# Patient Record
Sex: MALE | Race: WHITE | NOT HISPANIC OR LATINO | Employment: FULL TIME | URBAN - METROPOLITAN AREA
[De-identification: names, ages, dates, MRNs, and addresses within clinical notes are randomized per-mention and may not be internally consistent; named-entity substitution may affect disease eponyms.]

---

## 2019-05-22 ENCOUNTER — LAB (OUTPATIENT)
Dept: INTERNAL MEDICINE | Facility: CLINIC | Age: 44
End: 2019-05-22

## 2019-05-22 DIAGNOSIS — E78.2 MIXED HYPERLIPIDEMIA: Primary | ICD-10-CM

## 2019-05-22 DIAGNOSIS — E11.8 TYPE 2 DIABETES MELLITUS WITH COMPLICATION, WITHOUT LONG-TERM CURRENT USE OF INSULIN (HCC): ICD-10-CM

## 2019-05-22 DIAGNOSIS — I10 ESSENTIAL HYPERTENSION: ICD-10-CM

## 2019-05-22 NOTE — PROGRESS NOTES
Subjective   No chief complaint on file.      History of Present Illness     44 yo wm presents for follow up regarding his DM, HTN, HLD and obesity. He was last seen by myself on 09/ 28/18 at which time his A1C was 7.1%. He report that he had fallen off the wagon and was insistent that he was committed to LSM to better control his diabetes. He is eating lots of salad, drinking water, and is going to the gym 2-3 times a week. He has never had formal diabetes education. He will have his eyes checked in July. Denies polyuria, phagia or dipsia. He is only taking his glipizide once daily. Denies CP or dyspnea.       Patient Active Problem List   Diagnosis   • Hypertension   • Diabetes mellitus (CMS/HCC)       Allergies no known allergies    Current Outpatient Medications on File Prior to Visit   Medication Sig Dispense Refill   • aspirin 81 MG EC tablet Take 81 mg by mouth Daily.     • atorvastatin (LIPITOR) 10 MG tablet Take 10 mg by mouth Daily.     • glipiZIDE (GLUCOTROL) 10 MG tablet Take 10 mg by mouth 2 (Two) Times a Day Before Meals.     • lisinopril (PRINIVIL,ZESTRIL) 20 MG tablet Take 20 mg by mouth Daily.     • naproxen sodium (ALEVE) 220 MG tablet Take 220 mg by mouth 2 (Two) Times a Day As Needed.     • raNITIdine HCl (ZANTAC PO) Take  by mouth.     • sitaGLIPtin-metFORMIN (JANUMET)  MG per tablet Take 1 tablet by mouth 2 (Two) Times a Day With Meals.       No current facility-administered medications on file prior to visit.        Past Medical History:   Diagnosis Date   • Diabetes mellitus (CMS/HCC)    • GERD (gastroesophageal reflux disease)    • Hypertension    • Low back pain    • Neck pain        No family history on file.    Social History     Socioeconomic History   • Marital status:      Spouse name: Not on file   • Number of children: Not on file   • Years of education: Not on file   • Highest education level: Not on file   Tobacco Use   • Smoking status: Never Smoker   Substance and  Sexual Activity   • Alcohol use: No     Frequency: Never       No past surgical history on file.      The following portions of the patient's history were reviewed and updated as appropriate: problem list, allergies, current medications, past medical history and past social history.    Review of Systems   Constitution: Negative.   Cardiovascular: Negative.    Respiratory: Negative.    Endocrine: Negative.        Immunization History   Administered Date(s) Administered   • DTaP 08/22/2014   • Flu Vaccine Quad PF >36MO 10/21/2017   • Pneumococcal Conjugate 13-Valent (PCV13) 09/28/2018   • Pneumococcal Polysaccharide (PPSV23) 09/04/2015   • Td 09/01/2004       Objective   There were no vitals filed for this visit.  Physical Exam   Constitutional: He is oriented to person, place, and time. He appears well-developed and well-nourished.   HENT:   Head: Normocephalic and atraumatic.   Eyes: No scleral icterus.   Neck: Neck supple.   Cardiovascular: Normal rate, regular rhythm, S1 normal, S2 normal, normal heart sounds and intact distal pulses. Exam reveals no gallop and no friction rub.   No murmur heard.  Pulmonary/Chest: Effort normal and breath sounds normal.   Musculoskeletal:   Ambulates without assistance; no clubbing, cyanosis or edema.    Neurological: He is alert and oriented to person, place, and time.   Skin: Skin is warm and dry.   Psychiatric: He has a normal mood and affect.   Vitals reviewed.      Procedures    Assessment/Plan   Darvin was seen today for follow-up and hyperlipidemia.    Diagnoses and all orders for this visit:    Type 2 diabetes mellitus without complication, without long-term current use of insulin (CMS/[facility])  Comments:  A1C not at goal; has only been taking Glipizide once daily. Will take it as prescribed. Encouraged LSM and will start diabetes education.   Orders:  -     Ambulatory Referral to Diabetic Education  -     atorvastatin (LIPITOR) 10 MG tablet; Take 1 tablet by mouth Daily.  -      glipiZIDE (GLUCOTROL) 10 MG tablet; Take 1 tablet by mouth 2 (Two) Times a Day Before Meals.  -     sitaGLIPtin-metFORMIN (JANUMET)  MG per tablet; Take 1 tablet by mouth 2 (Two) Times a Day With Meals.  -     Hepatitis B Vaccine Adult IM    Essential hypertension  Comments:  BP at goal and well controlled with Lisinopril 20 mg daily.   Orders:  -     lisinopril (PRINIVIL,ZESTRIL) 20 MG tablet; Take 1 tablet by mouth Daily.    Class 1 obesity without serious comorbidity with body mass index (BMI) of 33.0 to 33.9 in adult, unspecified obesity type  Comments:  Have strongly encouraged weight loss with goal of 1 # weekly over the next 3 months for a weight loss of twelve pounds prior to his next visit.       Reviewed A1C, urine for microalbumin, FLP and CMP dated 05/22/19 as well as previous office visit dated 09/28/18.     Discussion: Unfortunately he has only been taking his glipizide once daily and has not kept his follow up appointments. I have stressed the importance of glycemic control with patient and that he needs to be seen every 3 months . He needs formal diabetes education as well. LSM strongly emphasized.     30 minutes spent with patient with greater than 50% counseling and coordinating care.     No Follow-up on file.

## 2019-05-23 PROBLEM — I10 HYPERTENSION: Status: ACTIVE | Noted: 2019-05-23

## 2019-05-23 PROBLEM — E78.00 PURE HYPERCHOLESTEROLEMIA: Status: ACTIVE | Noted: 2019-05-23

## 2019-05-23 PROBLEM — E11.9 DIABETES MELLITUS: Status: ACTIVE | Noted: 2019-05-23

## 2019-05-23 LAB
ALBUMIN SERPL-MCNC: 4.3 G/DL (ref 3.5–5.2)
ALBUMIN/CREAT UR: <7.4 MG/G CREAT (ref 0–30)
ALBUMIN/GLOB SERPL: 1.6 G/DL
ALP SERPL-CCNC: 58 U/L (ref 39–117)
ALT SERPL-CCNC: 48 U/L (ref 1–41)
AST SERPL-CCNC: 26 U/L (ref 1–40)
BILIRUB SERPL-MCNC: 0.4 MG/DL (ref 0.2–1.2)
BUN SERPL-MCNC: 8 MG/DL (ref 6–20)
BUN/CREAT SERPL: 8.6 (ref 7–25)
CALCIUM SERPL-MCNC: 10.4 MG/DL (ref 8.6–10.5)
CHLORIDE SERPL-SCNC: 101 MMOL/L (ref 98–107)
CHOLEST SERPL-MCNC: 120 MG/DL (ref 0–200)
CO2 SERPL-SCNC: 26.1 MMOL/L (ref 22–29)
CREAT SERPL-MCNC: 0.93 MG/DL (ref 0.76–1.27)
CREAT UR-MCNC: 40.5 MG/DL
GLOBULIN SER CALC-MCNC: 2.7 GM/DL
GLUCOSE SERPL-MCNC: 111 MG/DL (ref 65–99)
HBA1C MFR BLD: 8.9 % (ref 4.8–5.6)
HDLC SERPL-MCNC: 41 MG/DL (ref 40–60)
LDLC SERPL CALC-MCNC: 51 MG/DL (ref 0–100)
MICROALBUMIN UR-MCNC: <3 UG/ML
POTASSIUM SERPL-SCNC: 4.3 MMOL/L (ref 3.5–5.2)
PROT SERPL-MCNC: 7 G/DL (ref 6–8.5)
SODIUM SERPL-SCNC: 140 MMOL/L (ref 136–145)
TRIGL SERPL-MCNC: 139 MG/DL (ref 0–150)
VLDLC SERPL CALC-MCNC: 27.8 MG/DL

## 2019-05-24 ENCOUNTER — OFFICE VISIT (OUTPATIENT)
Dept: INTERNAL MEDICINE | Facility: CLINIC | Age: 44
End: 2019-05-24

## 2019-05-24 VITALS
HEIGHT: 72 IN | BODY MASS INDEX: 33.86 KG/M2 | HEART RATE: 82 BPM | WEIGHT: 250 LBS | OXYGEN SATURATION: 98 % | TEMPERATURE: 97.8 F

## 2019-05-24 DIAGNOSIS — E11.9 TYPE 2 DIABETES MELLITUS WITHOUT COMPLICATION, WITHOUT LONG-TERM CURRENT USE OF INSULIN (HCC): Primary | ICD-10-CM

## 2019-05-24 DIAGNOSIS — E66.9 CLASS 1 OBESITY WITHOUT SERIOUS COMORBIDITY WITH BODY MASS INDEX (BMI) OF 33.0 TO 33.9 IN ADULT, UNSPECIFIED OBESITY TYPE: ICD-10-CM

## 2019-05-24 DIAGNOSIS — I10 ESSENTIAL HYPERTENSION: ICD-10-CM

## 2019-05-24 PROBLEM — E66.811 CLASS 1 OBESITY WITHOUT SERIOUS COMORBIDITY WITH BODY MASS INDEX (BMI) OF 33.0 TO 33.9 IN ADULT: Status: ACTIVE | Noted: 2019-05-24

## 2019-05-24 PROCEDURE — 90471 IMMUNIZATION ADMIN: CPT | Performed by: NURSE PRACTITIONER

## 2019-05-24 PROCEDURE — 99214 OFFICE O/P EST MOD 30 MIN: CPT | Performed by: NURSE PRACTITIONER

## 2019-05-24 PROCEDURE — 90746 HEPB VACCINE 3 DOSE ADULT IM: CPT | Performed by: NURSE PRACTITIONER

## 2019-05-24 RX ORDER — SITAGLIPTIN AND METFORMIN HYDROCHLORIDE 1000; 50 MG/1; MG/1
TABLET, FILM COATED, EXTENDED RELEASE ORAL
Refills: 3 | COMMUNITY
Start: 2019-04-24 | End: 2019-05-24 | Stop reason: SDUPTHER

## 2019-05-24 RX ORDER — ATORVASTATIN CALCIUM 10 MG/1
10 TABLET, FILM COATED ORAL DAILY
Qty: 30 TABLET | Refills: 5 | Status: SHIPPED | OUTPATIENT
Start: 2019-05-24 | End: 2019-11-22 | Stop reason: SDUPTHER

## 2019-05-24 RX ORDER — LISINOPRIL 20 MG/1
20 TABLET ORAL DAILY
Qty: 30 TABLET | Refills: 5 | Status: SHIPPED | OUTPATIENT
Start: 2019-05-24 | End: 2019-11-22 | Stop reason: SDUPTHER

## 2019-05-24 RX ORDER — GLIPIZIDE 10 MG/1
10 TABLET ORAL
Qty: 60 TABLET | Refills: 5 | Status: SHIPPED | OUTPATIENT
Start: 2019-05-24 | End: 2019-11-22 | Stop reason: SDUPTHER

## 2019-05-28 ENCOUNTER — TELEPHONE (OUTPATIENT)
Dept: INTERNAL MEDICINE | Facility: CLINIC | Age: 44
End: 2019-05-28

## 2019-05-28 NOTE — TELEPHONE ENCOUNTER
Darvin Leonard saw Tiffanie Hernandez last Friday.  His diabetes medication that was sent to [facility] was incorrect.  It should have been Janumet XR and it was just regulare Janumet.  He did not pick it up yet. Phone number is [phone].  He would like the correct one sent in please.

## 2019-05-29 DIAGNOSIS — E11.9 TYPE 2 DIABETES MELLITUS WITHOUT COMPLICATION, WITHOUT LONG-TERM CURRENT USE OF INSULIN (HCC): ICD-10-CM

## 2019-07-05 ENCOUNTER — CLINICAL SUPPORT (OUTPATIENT)
Dept: INTERNAL MEDICINE | Facility: CLINIC | Age: 44
End: 2019-07-05

## 2019-07-05 DIAGNOSIS — Z23 NEED FOR HEPATITIS B VACCINATION: Primary | ICD-10-CM

## 2019-07-05 PROCEDURE — 90471 IMMUNIZATION ADMIN: CPT | Performed by: NURSE PRACTITIONER

## 2019-07-05 PROCEDURE — 90746 HEPB VACCINE 3 DOSE ADULT IM: CPT | Performed by: NURSE PRACTITIONER

## 2019-08-27 DIAGNOSIS — E11.9 TYPE 2 DIABETES MELLITUS WITHOUT COMPLICATION, WITHOUT LONG-TERM CURRENT USE OF INSULIN (HCC): Primary | ICD-10-CM

## 2019-11-22 ENCOUNTER — LAB (OUTPATIENT)
Dept: INTERNAL MEDICINE | Facility: CLINIC | Age: 44
End: 2019-11-22

## 2019-11-22 ENCOUNTER — CLINICAL SUPPORT (OUTPATIENT)
Dept: INTERNAL MEDICINE | Facility: CLINIC | Age: 44
End: 2019-11-22

## 2019-11-22 DIAGNOSIS — I10 ESSENTIAL HYPERTENSION: ICD-10-CM

## 2019-11-22 DIAGNOSIS — E11.9 TYPE 2 DIABETES MELLITUS WITHOUT COMPLICATION, WITHOUT LONG-TERM CURRENT USE OF INSULIN (HCC): ICD-10-CM

## 2019-11-22 DIAGNOSIS — Z23 NEED FOR HEPATITIS B VACCINATION: Primary | ICD-10-CM

## 2019-11-22 PROCEDURE — 90746 HEPB VACCINE 3 DOSE ADULT IM: CPT | Performed by: NURSE PRACTITIONER

## 2019-11-22 PROCEDURE — 90471 IMMUNIZATION ADMIN: CPT | Performed by: NURSE PRACTITIONER

## 2019-11-22 RX ORDER — LISINOPRIL 20 MG/1
20 TABLET ORAL DAILY
Qty: 30 TABLET | Refills: 0 | Status: SHIPPED | OUTPATIENT
Start: 2019-11-22 | End: 2019-12-23

## 2019-11-22 RX ORDER — ATORVASTATIN CALCIUM 10 MG/1
10 TABLET, FILM COATED ORAL DAILY
Qty: 30 TABLET | Refills: 0 | Status: SHIPPED | OUTPATIENT
Start: 2019-11-22 | End: 2019-12-23 | Stop reason: SDUPTHER

## 2019-11-22 RX ORDER — GLIPIZIDE 10 MG/1
10 TABLET ORAL
Qty: 60 TABLET | Refills: 0 | Status: SHIPPED | OUTPATIENT
Start: 2019-11-22 | End: 2019-12-23 | Stop reason: SDUPTHER

## 2019-11-23 LAB — HBA1C MFR BLD: 8.8 % (ref 4.8–5.6)

## 2019-11-24 NOTE — PROGRESS NOTES
Subjective   Chief Complaint   Patient presents with   • Diabetes   • Hypertension   • Obesity   • Hyperlipidemia       History of Present Illness     42 yo wm presents for follow up regarding DM, HTN, HLD and obesity.  He eats mostly grilled chicken, rice and salad. He denies low blood sugars. He has not had his eyes checked in 1-2 years. He has not had his flu shot. He denies chest pain or dyspnea. Exercises infrequently. Has had DMed 3 years ago and initially had great success with weight loss. Has not had his flu shot yet.      Patient Active Problem List   Diagnosis   • Hypertension   • Diabetes mellitus (CMS/HCC)   • Class 1 obesity without serious comorbidity with body mass index (BMI) of 33.0 to 33.9 in adult       No Known Allergies    Current Outpatient Medications on File Prior to Visit   Medication Sig Dispense Refill   • aspirin 81 MG EC tablet Take 81 mg by mouth Daily.     • atorvastatin (LIPITOR) 10 MG tablet Take 1 tablet by mouth Daily. 30 tablet 0   • glipizide (GLUCOTROL) 10 MG tablet Take 1 tablet by mouth 2 (Two) Times a Day Before Meals. 60 tablet 0   • lisinopril (PRINIVIL,ZESTRIL) 20 MG tablet Take 1 tablet by mouth Daily. 30 tablet 0   • naproxen sodium (ALEVE) 220 MG tablet Take 220 mg by mouth 2 (Two) Times a Day As Needed.     • [DISCONTINUED] SITagliptin-metFORMIN HCl ER (JANUMET XR)  MG tablet Take 1 tablet by mouth 2 (Two) Times a Day. 60 tablet 0   • raNITIdine HCl (ZANTAC PO) Take  by mouth.       No current facility-administered medications on file prior to visit.        Past Medical History:   Diagnosis Date   • Diabetes mellitus (CMS/HCC)    • Fatigue    • GERD (gastroesophageal reflux disease)    • Headache    • Hypertension    • Knee pain    • Low back pain    • Neck pain        Family History   Problem Relation Age of Onset   • Diabetes Maternal Grandmother        Social History     Socioeconomic History   • Marital status:      Spouse name: Not on file   •  "Number of children: Not on file   • Years of education: Not on file   • Highest education level: Not on file   Tobacco Use   • Smoking status: Never Smoker   Substance and Sexual Activity   • Alcohol use: Yes     Frequency: Never     Comment: socially        History reviewed. No pertinent surgical history.    The following portions of the patient's history were reviewed and updated as appropriate: problem list, allergies, current medications, past medical history, past family history, past social history and past surgical history.    Review of Systems   Respiratory: Negative for shortness of breath.    Cardiovascular: Negative for chest pain.       Immunization History   Administered Date(s) Administered   • Flu Vaccine Quad PF >36MO 10/21/2017   • Hepatitis B Vaccine Adult IM 05/24/2019, 07/05/2019, 11/22/2019   • Pneumococcal Conjugate 13-Valent (PCV13) 09/28/2018   • Pneumococcal Polysaccharide (PPSV23) 09/04/2015   • Td 09/01/2004   • Tdap 08/22/2014       Objective   Vitals:    11/26/19 1408 11/26/19 1455   BP:  116/76   Pulse:  92   Resp:  12   Weight: 113 kg (249 lb)    Height: 182.9 cm (72\")      Body mass index is 33.77 kg/m².  Physical Exam   Constitutional: He is oriented to person, place, and time. He appears well-developed and well-nourished.   HENT:   Head: Normocephalic and atraumatic.   Neck: Neck supple.   Cardiovascular: Normal rate, regular rhythm, S1 normal, S2 normal, normal heart sounds and intact distal pulses.   Pulmonary/Chest: Effort normal and breath sounds normal.   Musculoskeletal:   Ambulates without assistance; no clubbing, cyanosis or edema.     Darvin had a diabetic foot exam performed today.   During the foot exam he had a monofilament test performed.  Neurological: He is alert and oriented to person, place, and time.   Skin: Skin is warm and dry.   Psychiatric: He has a normal mood and affect.   Vitals reviewed.      Procedures    Assessment/Plan   Darvin was seen today for " diabetes, hypertension, obesity and hyperlipidemia.    Diagnoses and all orders for this visit:    Type 2 diabetes mellitus without complication, without long-term current use of insulin (CMS/[facility])  Comments:  Not at goal. A1C 8.8%. Stressed importance of compliance with follow up visits every 3 months.Eye exam prior to f/up visit.   Orders:  -     Hemoglobin A1c; Future  -     Empagliflozin (JARDIANCE) 10 MG tablet; Take 10 mg by mouth Daily for 120 days.  -     metFORMIN (GLUCOPHAGE) 1000 MG tablet; Take 1 tablet by mouth 2 (Two) Times a Day With Meals.    Class 1 obesity without serious comorbidity with body mass index (BMI) of 33.0 to 33.9 in adult, unspecified obesity type  Comments:  Strongly advised weight loss via dietary changes and 150 minutes aerobic activity weekly.     Essential hypertension  Comments:  Well controlled. Continue Lisinopril 20 mg daily.   Orders:  -     Comprehensive Metabolic Panel; Future    Heart murmur  Comments:  New problem; further MDM pending 2D echo.   Orders:  -     Adult Transthoracic Echo Complete W/ Cont if Necessary Per Protocol; Future    Records reviewed include previous OV with myself and recent labs.     Return in about 3 months (around 2/26/2020).

## 2019-11-26 ENCOUNTER — OFFICE VISIT (OUTPATIENT)
Dept: INTERNAL MEDICINE | Facility: CLINIC | Age: 44
End: 2019-11-26

## 2019-11-26 VITALS
SYSTOLIC BLOOD PRESSURE: 116 MMHG | HEIGHT: 72 IN | BODY MASS INDEX: 33.72 KG/M2 | DIASTOLIC BLOOD PRESSURE: 76 MMHG | RESPIRATION RATE: 12 BRPM | HEART RATE: 92 BPM | WEIGHT: 249 LBS

## 2019-11-26 DIAGNOSIS — E66.9 CLASS 1 OBESITY WITHOUT SERIOUS COMORBIDITY WITH BODY MASS INDEX (BMI) OF 33.0 TO 33.9 IN ADULT, UNSPECIFIED OBESITY TYPE: ICD-10-CM

## 2019-11-26 DIAGNOSIS — I10 ESSENTIAL HYPERTENSION: ICD-10-CM

## 2019-11-26 DIAGNOSIS — R01.1 HEART MURMUR: ICD-10-CM

## 2019-11-26 DIAGNOSIS — E11.9 TYPE 2 DIABETES MELLITUS WITHOUT COMPLICATION, WITHOUT LONG-TERM CURRENT USE OF INSULIN (HCC): Primary | ICD-10-CM

## 2019-11-26 PROCEDURE — 99214 OFFICE O/P EST MOD 30 MIN: CPT | Performed by: NURSE PRACTITIONER

## 2019-12-02 ENCOUNTER — TELEPHONE (OUTPATIENT)
Dept: INTERNAL MEDICINE | Facility: CLINIC | Age: 44
End: 2019-12-02

## 2019-12-02 ENCOUNTER — CLINICAL SUPPORT (OUTPATIENT)
Dept: INTERNAL MEDICINE | Facility: CLINIC | Age: 44
End: 2019-12-02

## 2019-12-02 DIAGNOSIS — E11.9 TYPE 2 DIABETES MELLITUS WITHOUT COMPLICATION, WITHOUT LONG-TERM CURRENT USE OF INSULIN (HCC): Primary | ICD-10-CM

## 2019-12-02 PROCEDURE — 90471 IMMUNIZATION ADMIN: CPT | Performed by: NURSE PRACTITIONER

## 2019-12-02 PROCEDURE — 90674 CCIIV4 VAC NO PRSV 0.5 ML IM: CPT | Performed by: NURSE PRACTITIONER

## 2019-12-02 RX ORDER — FLASH GLUCOSE SENSOR
1 KIT MISCELLANEOUS
Qty: 4 EACH | Refills: 11 | Status: SHIPPED | OUTPATIENT
Start: 2019-12-02 | End: 2020-03-13

## 2019-12-02 RX ORDER — FLASH GLUCOSE SENSOR
1 KIT MISCELLANEOUS DAILY
Qty: 1 DEVICE | Refills: 0 | Status: SHIPPED | OUTPATIENT
Start: 2019-12-02 | End: 2020-03-13

## 2019-12-02 NOTE — TELEPHONE ENCOUNTER
Pt in today for flu shot wants the new freestyle tom meter with strips or device please advise of orders

## 2019-12-16 ENCOUNTER — HOSPITAL ENCOUNTER (OUTPATIENT)
Dept: CARDIOLOGY | Facility: HOSPITAL | Age: 44
Discharge: HOME | End: 2019-12-16
Admitting: NURSE PRACTITIONER

## 2019-12-16 VITALS
SYSTOLIC BLOOD PRESSURE: 148 MMHG | HEIGHT: 72 IN | DIASTOLIC BLOOD PRESSURE: 86 MMHG | HEART RATE: 111 BPM | OXYGEN SATURATION: 97 % | WEIGHT: 247 LBS | BODY MASS INDEX: 33.46 KG/M2

## 2019-12-16 DIAGNOSIS — R01.1 HEART MURMUR: ICD-10-CM

## 2019-12-16 LAB
AORTIC ROOT ANNULUS: 2.2 CM
ASCENDING AORTA: 3.2 CM
BH CV ECHO MEAS - ACS: 2.2 CM
BH CV ECHO MEAS - AO MAX PG (FULL): 2.2 MMHG
BH CV ECHO MEAS - AO MAX PG: 7 MMHG
BH CV ECHO MEAS - AO MEAN PG (FULL): 2 MMHG
BH CV ECHO MEAS - AO MEAN PG: 5 MMHG
BH CV ECHO MEAS - AO ROOT AREA (BSA CORRECTED): 1.5
BH CV ECHO MEAS - AO ROOT AREA: 9.6 CM^2
BH CV ECHO MEAS - AO ROOT DIAM: 3.5 CM
BH CV ECHO MEAS - AO V2 MAX: 132 CM/SEC
BH CV ECHO MEAS - AO V2 MEAN: 103 CM/SEC
BH CV ECHO MEAS - AO V2 VTI: 24.5 CM
BH CV ECHO MEAS - AVA(I,A): 3.2 CM^2
BH CV ECHO MEAS - AVA(I,D): 3.2 CM^2
BH CV ECHO MEAS - AVA(V,A): 3.4 CM^2
BH CV ECHO MEAS - AVA(V,D): 3.4 CM^2
BH CV ECHO MEAS - BSA(HAYCOCK): 2.4 M^2
BH CV ECHO MEAS - BSA: 2.3 M^2
BH CV ECHO MEAS - BZI_BMI: 33.5 KILOGRAMS/M^2
BH CV ECHO MEAS - BZI_METRIC_HEIGHT: 182.9 CM
BH CV ECHO MEAS - BZI_METRIC_WEIGHT: 112 KG
BH CV ECHO MEAS - EDV(MOD-SP2): 76.4 ML
BH CV ECHO MEAS - EDV(MOD-SP4): 86.6 ML
BH CV ECHO MEAS - EDV(TEICH): 83.1 ML
BH CV ECHO MEAS - EF(CUBED): 72.4 %
BH CV ECHO MEAS - EF(MOD-BP): 53 %
BH CV ECHO MEAS - EF(MOD-SP2): 52.7 %
BH CV ECHO MEAS - EF(MOD-SP4): 52.4 %
BH CV ECHO MEAS - EF(TEICH): 64.4 %
BH CV ECHO MEAS - ESV(MOD-SP2): 36.1 ML
BH CV ECHO MEAS - ESV(MOD-SP4): 41.2 ML
BH CV ECHO MEAS - ESV(TEICH): 29.6 ML
BH CV ECHO MEAS - FS: 34.9 %
BH CV ECHO MEAS - IVS/LVPW: 1
BH CV ECHO MEAS - IVSD: 1.2 CM
BH CV ECHO MEAS - LAT PEAK E' VEL: 6 CM/SEC
BH CV ECHO MEAS - LV DIASTOLIC VOL/BSA (35-75): 37.2 ML/M^2
BH CV ECHO MEAS - LV MASS(C)D: 184.7 GRAMS
BH CV ECHO MEAS - LV MASS(C)DI: 79.3 GRAMS/M^2
BH CV ECHO MEAS - LV MAX PG: 4.8 MMHG
BH CV ECHO MEAS - LV MEAN PG: 3 MMHG
BH CV ECHO MEAS - LV SYSTOLIC VOL/BSA (12-30): 17.7 ML/M^2
BH CV ECHO MEAS - LV V1 MAX: 109 CM/SEC
BH CV ECHO MEAS - LV V1 MEAN: 80.3 CM/SEC
BH CV ECHO MEAS - LV V1 VTI: 18.6 CM
BH CV ECHO MEAS - LVIDD: 4.3 CM
BH CV ECHO MEAS - LVIDS: 2.8 CM
BH CV ECHO MEAS - LVLD AP2: 8 CM
BH CV ECHO MEAS - LVLD AP4: 7.4 CM
BH CV ECHO MEAS - LVLS AP2: 6.6 CM
BH CV ECHO MEAS - LVLS AP4: 6.8 CM
BH CV ECHO MEAS - LVOT AREA (M): 4.2 CM^2
BH CV ECHO MEAS - LVOT AREA: 4.2 CM^2
BH CV ECHO MEAS - LVOT DIAM: 2.3 CM
BH CV ECHO MEAS - LVPWD: 1.2 CM
BH CV ECHO MEAS - MED PEAK E' VEL: 6 CM/SEC
BH CV ECHO MEAS - MV A MAX VEL: 103 CM/SEC
BH CV ECHO MEAS - MV DEC SLOPE: 647 CM/SEC^2
BH CV ECHO MEAS - MV DEC TIME: 0.13 SEC
BH CV ECHO MEAS - MV E MAX VEL: 71.7 CM/SEC
BH CV ECHO MEAS - MV E/A: 0.7
BH CV ECHO MEAS - MV MAX PG: 5.7 MMHG
BH CV ECHO MEAS - MV MEAN PG: 3 MMHG
BH CV ECHO MEAS - MV P1/2T MAX VEL: 84.2 CM/SEC
BH CV ECHO MEAS - MV P1/2T: 38.1 MSEC
BH CV ECHO MEAS - MV V2 MAX: 119 CM/SEC
BH CV ECHO MEAS - MV V2 MEAN: 89.3 CM/SEC
BH CV ECHO MEAS - MV V2 VTI: 19.4 CM
BH CV ECHO MEAS - MVA P1/2T LCG: 2.6 CM^2
BH CV ECHO MEAS - MVA(P1/2T): 5.8 CM^2
BH CV ECHO MEAS - MVA(VTI): 4 CM^2
BH CV ECHO MEAS - PA MAX PG (FULL): 3.2 MMHG
BH CV ECHO MEAS - PA MAX PG: 5.1 MMHG
BH CV ECHO MEAS - PA V2 MAX: 113 CM/SEC
BH CV ECHO MEAS - PULM A REVS DUR: 0.09 SEC
BH CV ECHO MEAS - PULM A REVS VEL: 36.9 CM/SEC
BH CV ECHO MEAS - PULM DIAS VEL: 38.8 CM/SEC
BH CV ECHO MEAS - PULM S/D: 1.5
BH CV ECHO MEAS - PULM SYS VEL: 58.7 CM/SEC
BH CV ECHO MEAS - PVA(V,A): 2.7 CM^2
BH CV ECHO MEAS - PVA(V,D): 2.7 CM^2
BH CV ECHO MEAS - QP/QS: 0.61
BH CV ECHO MEAS - RV MAX PG: 1.9 MMHG
BH CV ECHO MEAS - RV MEAN PG: 1 MMHG
BH CV ECHO MEAS - RV V1 MAX: 68.5 CM/SEC
BH CV ECHO MEAS - RV V1 MEAN: 44.6 CM/SEC
BH CV ECHO MEAS - RV V1 VTI: 10.5 CM
BH CV ECHO MEAS - RVOT AREA: 4.5 CM^2
BH CV ECHO MEAS - RVOT DIAM: 2.4 CM
BH CV ECHO MEAS - SI(AO): 101.2 ML/M^2
BH CV ECHO MEAS - SI(CUBED): 24.7 ML/M^2
BH CV ECHO MEAS - SI(LVOT): 33.2 ML/M^2
BH CV ECHO MEAS - SI(MOD-SP2): 17.3 ML/M^2
BH CV ECHO MEAS - SI(MOD-SP4): 19.5 ML/M^2
BH CV ECHO MEAS - SI(TEICH): 23 ML/M^2
BH CV ECHO MEAS - SUP REN AO DIAM: 1.6 CM
BH CV ECHO MEAS - SV(AO): 235.7 ML
BH CV ECHO MEAS - SV(CUBED): 57.6 ML
BH CV ECHO MEAS - SV(LVOT): 77.3 ML
BH CV ECHO MEAS - SV(MOD-SP2): 40.3 ML
BH CV ECHO MEAS - SV(MOD-SP4): 45.4 ML
BH CV ECHO MEAS - SV(RVOT): 47.5 ML
BH CV ECHO MEAS - SV(TEICH): 53.5 ML
BH CV ECHO MEAS - TAPSE (>1.6): 1.8 CM2
BH CV ECHO MEASUREMENTS AVERAGE E/E' RATIO: 11.95
BH CV XLRA - TDI S': 16 CM/SEC
LEFT ATRIUM VOLUME INDEX: 22 ML/M2
LEFT ATRIUM VOLUME: 50 CM3
MAXIMAL PREDICTED HEART RATE: 177 BPM
SINUS: 3.4 CM
STJ: 3.2 CM
STRESS TARGET HR: 150 BPM

## 2019-12-16 PROCEDURE — 93306 TTE W/DOPPLER COMPLETE: CPT

## 2019-12-16 PROCEDURE — 93306 TTE W/DOPPLER COMPLETE: CPT | Performed by: INTERNAL MEDICINE

## 2019-12-22 DIAGNOSIS — I10 ESSENTIAL HYPERTENSION: ICD-10-CM

## 2019-12-23 DIAGNOSIS — E11.9 TYPE 2 DIABETES MELLITUS WITHOUT COMPLICATION, WITHOUT LONG-TERM CURRENT USE OF INSULIN (HCC): ICD-10-CM

## 2019-12-23 RX ORDER — ATORVASTATIN CALCIUM 10 MG/1
10 TABLET, FILM COATED ORAL DAILY
Qty: 30 TABLET | Refills: 3 | Status: SHIPPED | OUTPATIENT
Start: 2019-12-23 | End: 2020-03-13 | Stop reason: SDUPTHER

## 2019-12-23 RX ORDER — LISINOPRIL 20 MG/1
TABLET ORAL
Qty: 30 TABLET | Refills: 0 | Status: SHIPPED | OUTPATIENT
Start: 2019-12-23 | End: 2020-01-21

## 2019-12-23 RX ORDER — GLIPIZIDE 10 MG/1
10 TABLET ORAL
Qty: 60 TABLET | Refills: 2 | Status: SHIPPED | OUTPATIENT
Start: 2019-12-23 | End: 2020-03-13 | Stop reason: SDUPTHER

## 2020-01-20 DIAGNOSIS — I10 ESSENTIAL HYPERTENSION: ICD-10-CM

## 2020-01-21 RX ORDER — LISINOPRIL 20 MG/1
TABLET ORAL
Qty: 30 TABLET | Refills: 0 | Status: SHIPPED | OUTPATIENT
Start: 2020-01-21 | End: 2020-02-24

## 2020-01-29 ENCOUNTER — APPOINTMENT (OUTPATIENT)
Dept: CT IMAGING | Facility: HOSPITAL | Age: 45
End: 2020-01-29

## 2020-01-29 ENCOUNTER — HOSPITAL ENCOUNTER (INPATIENT)
Facility: HOSPITAL | Age: 45
LOS: 3 days | Discharge: HOME | End: 2020-02-01
Attending: EMERGENCY MEDICINE | Admitting: INTERNAL MEDICINE

## 2020-01-29 ENCOUNTER — HOSPITAL ENCOUNTER (OUTPATIENT)
Dept: GENERAL RADIOLOGY | Facility: HOSPITAL | Age: 45
Discharge: HOME | End: 2020-01-29
Admitting: PHYSICIAN ASSISTANT

## 2020-01-29 ENCOUNTER — OFFICE VISIT (OUTPATIENT)
Dept: INTERNAL MEDICINE | Facility: CLINIC | Age: 45
End: 2020-01-29

## 2020-01-29 VITALS
BODY MASS INDEX: 31.97 KG/M2 | HEIGHT: 72 IN | TEMPERATURE: 98.8 F | WEIGHT: 236 LBS | SYSTOLIC BLOOD PRESSURE: 120 MMHG | DIASTOLIC BLOOD PRESSURE: 68 MMHG

## 2020-01-29 DIAGNOSIS — E16.2 HYPOGLYCEMIA: Primary | ICD-10-CM

## 2020-01-29 DIAGNOSIS — K21.9 GASTROESOPHAGEAL REFLUX DISEASE WITHOUT ESOPHAGITIS: Primary | ICD-10-CM

## 2020-01-29 DIAGNOSIS — R05.9 COUGH: ICD-10-CM

## 2020-01-29 DIAGNOSIS — J18.9 PNEUMONIA: ICD-10-CM

## 2020-01-29 DIAGNOSIS — E87.1 HYPONATREMIA: ICD-10-CM

## 2020-01-29 DIAGNOSIS — G47.33 OSA (OBSTRUCTIVE SLEEP APNEA): ICD-10-CM

## 2020-01-29 DIAGNOSIS — J85.1 ABSCESS OF UPPER LOBE OF RIGHT LUNG WITH PNEUMONIA (HCC): ICD-10-CM

## 2020-01-29 LAB
ALBUMIN SERPL-MCNC: 3.1 G/DL (ref 3.5–5.2)
ALBUMIN/GLOB SERPL: 0.7 G/DL
ALP SERPL-CCNC: 86 U/L (ref 39–117)
ALT SERPL W P-5'-P-CCNC: 44 U/L (ref 1–41)
ANION GAP SERPL CALCULATED.3IONS-SCNC: 16.2 MMOL/L (ref 5–15)
AST SERPL-CCNC: 36 U/L (ref 1–40)
BASOPHILS # BLD AUTO: 0.04 10*3/MM3 (ref 0–0.2)
BASOPHILS NFR BLD AUTO: 0.2 % (ref 0–1.5)
BILIRUB SERPL-MCNC: 0.4 MG/DL (ref 0.2–1.2)
BUN BLD-MCNC: 12 MG/DL (ref 6–20)
BUN/CREAT SERPL: 16.9 (ref 7–25)
CALCIUM SPEC-SCNC: 9.1 MG/DL (ref 8.6–10.5)
CHLORIDE SERPL-SCNC: 91 MMOL/L (ref 98–107)
CO2 SERPL-SCNC: 20.8 MMOL/L (ref 22–29)
CREAT BLD-MCNC: 0.71 MG/DL (ref 0.76–1.27)
D-LACTATE SERPL-SCNC: 0.9 MMOL/L (ref 0.5–2)
DEPRECATED RDW RBC AUTO: 41.1 FL (ref 37–54)
EOSINOPHIL # BLD AUTO: 0.01 10*3/MM3 (ref 0–0.4)
EOSINOPHIL NFR BLD AUTO: 0.1 % (ref 0.3–6.2)
ERYTHROCYTE [DISTWIDTH] IN BLOOD BY AUTOMATED COUNT: 13.1 % (ref 12.3–15.4)
GFR SERPL CREATININE-BSD FRML MDRD: 121 ML/MIN/1.73
GLOBULIN UR ELPH-MCNC: 4.4 GM/DL
GLUCOSE BLD-MCNC: 49 MG/DL (ref 65–99)
GLUCOSE BLDC GLUCOMTR-MCNC: 102 MG/DL (ref 70–130)
GLUCOSE BLDC GLUCOMTR-MCNC: 96 MG/DL (ref 70–130)
HCT VFR BLD AUTO: 33.8 % (ref 37.5–51)
HGB BLD-MCNC: 11.1 G/DL (ref 13–17.7)
IMM GRANULOCYTES # BLD AUTO: 0.26 10*3/MM3 (ref 0–0.05)
IMM GRANULOCYTES NFR BLD AUTO: 1.5 % (ref 0–0.5)
LYMPHOCYTES # BLD AUTO: 1.16 10*3/MM3 (ref 0.7–3.1)
LYMPHOCYTES NFR BLD AUTO: 6.5 % (ref 19.6–45.3)
MCH RBC QN AUTO: 28 PG (ref 26.6–33)
MCHC RBC AUTO-ENTMCNC: 32.8 G/DL (ref 31.5–35.7)
MCV RBC AUTO: 85.4 FL (ref 79–97)
MONOCYTES # BLD AUTO: 0.98 10*3/MM3 (ref 0.1–0.9)
MONOCYTES NFR BLD AUTO: 5.5 % (ref 5–12)
NEUTROPHILS # BLD AUTO: 15.46 10*3/MM3 (ref 1.7–7)
NEUTROPHILS NFR BLD AUTO: 86.2 % (ref 42.7–76)
NRBC BLD AUTO-RTO: 0 /100 WBC (ref 0–0.2)
PLATELET # BLD AUTO: 456 10*3/MM3 (ref 140–450)
PMV BLD AUTO: 9.5 FL (ref 6–12)
POTASSIUM BLD-SCNC: 3.9 MMOL/L (ref 3.5–5.2)
PROCALCITONIN SERPL-MCNC: 0.42 NG/ML (ref 0.1–0.25)
PROT SERPL-MCNC: 7.5 G/DL (ref 6–8.5)
RBC # BLD AUTO: 3.96 10*6/MM3 (ref 4.14–5.8)
SODIUM BLD-SCNC: 128 MMOL/L (ref 136–145)
WBC NRBC COR # BLD: 17.91 10*3/MM3 (ref 3.4–10.8)

## 2020-01-29 PROCEDURE — 87040 BLOOD CULTURE FOR BACTERIA: CPT | Performed by: EMERGENCY MEDICINE

## 2020-01-29 PROCEDURE — 82962 GLUCOSE BLOOD TEST: CPT

## 2020-01-29 PROCEDURE — 84145 PROCALCITONIN (PCT): CPT | Performed by: EMERGENCY MEDICINE

## 2020-01-29 PROCEDURE — 83605 ASSAY OF LACTIC ACID: CPT | Performed by: EMERGENCY MEDICINE

## 2020-01-29 PROCEDURE — 25010000002 VANCOMYCIN 10 G RECONSTITUTED SOLUTION: Performed by: EMERGENCY MEDICINE

## 2020-01-29 PROCEDURE — 99213 OFFICE O/P EST LOW 20 MIN: CPT | Performed by: PHYSICIAN ASSISTANT

## 2020-01-29 PROCEDURE — 85025 COMPLETE CBC W/AUTO DIFF WBC: CPT | Performed by: EMERGENCY MEDICINE

## 2020-01-29 PROCEDURE — 71260 CT THORAX DX C+: CPT

## 2020-01-29 PROCEDURE — 80053 COMPREHEN METABOLIC PANEL: CPT | Performed by: EMERGENCY MEDICINE

## 2020-01-29 PROCEDURE — 25010000002 PIPERACILLIN SOD-TAZOBACTAM PER 1 G: Performed by: EMERGENCY MEDICINE

## 2020-01-29 PROCEDURE — 99284 EMERGENCY DEPT VISIT MOD MDM: CPT

## 2020-01-29 PROCEDURE — 86140 C-REACTIVE PROTEIN: CPT | Performed by: INTERNAL MEDICINE

## 2020-01-29 PROCEDURE — 36415 COLL VENOUS BLD VENIPUNCTURE: CPT

## 2020-01-29 PROCEDURE — 25010000002 IOPAMIDOL 61 % SOLUTION: Performed by: EMERGENCY MEDICINE

## 2020-01-29 PROCEDURE — 71046 X-RAY EXAM CHEST 2 VIEWS: CPT

## 2020-01-29 RX ORDER — ACETAMINOPHEN 500 MG
1000 TABLET ORAL ONCE
Status: COMPLETED | OUTPATIENT
Start: 2020-01-29 | End: 2020-01-29

## 2020-01-29 RX ORDER — DEXTROSE MONOHYDRATE 25 G/50ML
25 INJECTION, SOLUTION INTRAVENOUS
Status: DISCONTINUED | OUTPATIENT
Start: 2020-01-29 | End: 2020-02-01 | Stop reason: HOSPADM

## 2020-01-29 RX ORDER — NICOTINE POLACRILEX 4 MG
15 LOZENGE BUCCAL
Status: DISCONTINUED | OUTPATIENT
Start: 2020-01-29 | End: 2020-02-01 | Stop reason: HOSPADM

## 2020-01-29 RX ORDER — OMEPRAZOLE 40 MG/1
40 CAPSULE, DELAYED RELEASE ORAL DAILY
Qty: 90 CAPSULE | Refills: 1 | Status: SHIPPED | OUTPATIENT
Start: 2020-01-29 | End: 2020-03-13 | Stop reason: SDUPTHER

## 2020-01-29 RX ORDER — SODIUM CHLORIDE 0.9 % (FLUSH) 0.9 %
10 SYRINGE (ML) INJECTION AS NEEDED
Status: DISCONTINUED | OUTPATIENT
Start: 2020-01-29 | End: 2020-02-01 | Stop reason: HOSPADM

## 2020-01-29 RX ORDER — SODIUM CHLORIDE 9 MG/ML
125 INJECTION, SOLUTION INTRAVENOUS CONTINUOUS
Status: DISCONTINUED | OUTPATIENT
Start: 2020-01-29 | End: 2020-01-31

## 2020-01-29 RX ADMIN — SODIUM CHLORIDE 125 ML/HR: 9 INJECTION, SOLUTION INTRAVENOUS at 22:30

## 2020-01-29 RX ADMIN — VANCOMYCIN HYDROCHLORIDE 2250 MG: 10 INJECTION, POWDER, LYOPHILIZED, FOR SOLUTION INTRAVENOUS at 19:30

## 2020-01-29 RX ADMIN — TAZOBACTAM SODIUM AND PIPERACILLIN SODIUM 3.38 G: 375; 3 INJECTION, SOLUTION INTRAVENOUS at 19:02

## 2020-01-29 RX ADMIN — SODIUM CHLORIDE, PRESERVATIVE FREE 10 ML: 5 INJECTION INTRAVENOUS at 23:35

## 2020-01-29 RX ADMIN — IOPAMIDOL 75 ML: 612 INJECTION, SOLUTION INTRAVENOUS at 18:25

## 2020-01-29 RX ADMIN — SODIUM CHLORIDE 1000 ML: 9 INJECTION, SOLUTION INTRAVENOUS at 17:54

## 2020-01-29 RX ADMIN — ACETAMINOPHEN 1000 MG: 500 TABLET, FILM COATED ORAL at 17:51

## 2020-01-29 NOTE — PROGRESS NOTES
Subjective   Chief Complaint   Patient presents with   • Cough     X 3 weeks   • Vomiting   • Sore Throat       History of Present Illness      Cough for 3 weeks. Daughter gave him popcorn in a tin cheddar flavored. That night he vomitted that was about 3 weeks ago. He has been coughing daily since this. Coughs so hard starts retching and will have some retching with mucus production. He has not had any fever or chills. Has not been eating much because he just does not feel well. Has some epigastric discomfort. Has lost about 10 lbs with this. No fever or chills.     He states that even when he doesn't eat he will still cough. He is having heartburn. He was on zantac but has not been for several months now. He is taking aleve as well almost daily sometimes twice daily. No blood in his stools. Took one tums, does not like the chalk taste. Does not drink any etoh.     Pt also has had sore throat and hoarseness since this started.    Patient Active Problem List   Diagnosis   • Hypertension   • Diabetes mellitus (CMS/[facility])   • Class 1 obesity without serious comorbidity with body mass index (BMI) of 33.0 to 33.9 in adult       No Known Allergies    Current Outpatient Medications on File Prior to Visit   Medication Sig Dispense Refill   • aspirin 81 MG EC tablet Take 81 mg by mouth Daily.     • atorvastatin (LIPITOR) 10 MG tablet Take 1 tablet by mouth Daily. 30 tablet 3   • Empagliflozin (JARDIANCE) 10 MG tablet Take 10 mg by mouth Daily for 120 days. 30 tablet 3   • glipizide (GLUCOTROL) 10 MG tablet Take 1 tablet by mouth 2 (Two) Times a Day Before Meals. 60 tablet 2   • lisinopril (PRINIVIL,ZESTRIL) 20 MG tablet TAKE 1 TABLET BY MOUTH ONCE DAILY 30 tablet 0   • metFORMIN (GLUCOPHAGE) 1000 MG tablet Take 1 tablet by mouth 2 (Two) Times a Day With Meals. 60 tablet 3   • naproxen sodium (ALEVE) 220 MG tablet Take 220 mg by mouth 2 (Two) Times a Day As Needed.     • Continuous Blood Gluc  (FREESTYLE GEORGINA READER)  device 1 Device Daily. 1 Device 0   • Continuous Blood Gluc Sensor (FREESTYLE GEORGINA 14 DAY SENSOR) misc 1 Cartridge Every 14 (Fourteen) Days. 4 each 11   • [DISCONTINUED] raNITIdine HCl (ZANTAC PO) Take  by mouth.       No current facility-administered medications on file prior to visit.        Past Medical History:   Diagnosis Date   • Diabetes mellitus (CMS/HCC)    • Fatigue    • GERD (gastroesophageal reflux disease)    • Headache    • Hypertension    • Knee pain    • Low back pain    • Neck pain        Family History   Problem Relation Age of Onset   • Diabetes Maternal Grandmother        Social History     Socioeconomic History   • Marital status:      Spouse name: Not on file   • Number of children: Not on file   • Years of education: Not on file   • Highest education level: Not on file   Tobacco Use   • Smoking status: Never Smoker   Substance and Sexual Activity   • Alcohol use: Yes     Frequency: Never     Comment: socially    • Drug use: No   • Sexual activity: Defer       No past surgical history on file.    The following portions of the patient's history were reviewed and updated as appropriate: problem list, allergies, current medications, past medical history, past family history, past social history and past surgical history.    Review of Systems   Constitution: Positive for decreased appetite (decreased appetite) and weight loss. Negative for chills, fever and night sweats.   Respiratory: Positive for cough.    Gastrointestinal: Positive for heartburn, nausea and vomiting. Negative for dysphagia and hematochezia.       Immunization History   Administered Date(s) Administered   • Flu Vaccine Quad PF >36MO 10/21/2017   • Hepatitis B Vaccine Adult IM 05/24/2019, 07/05/2019, 11/22/2019   • Pneumococcal Conjugate 13-Valent (PCV13) 09/28/2018   • Pneumococcal Polysaccharide (PPSV23) 09/04/2015   • Td 09/01/2004   • Tdap 08/22/2014   • flucelvax quad pfs =>4 YRS 12/02/2019       Objective   Vitals:  "   01/29/20 1409 01/29/20 1426   BP:  120/68   Temp: 98.8 °F (37.1 °C)    Weight: 107 kg (236 lb)    Height: 182.9 cm (72\")      Body mass index is 32.01 kg/m².  Physical Exam   Constitutional: He appears well-developed and well-nourished.   HENT:   Head: Normocephalic and atraumatic.   Cardiovascular: Normal rate, regular rhythm and normal heart sounds.   Pulmonary/Chest: Effort normal and breath sounds normal. He has no wheezes. He has no rales.   Abdominal: Soft. Bowel sounds are normal. There is tenderness (in epigastrium. ).   Vitals reviewed.    Assessment/Plan   Darvin was seen today for cough, vomiting and sore throat.    Diagnoses and all orders for this visit:    Gastroesophageal reflux disease without esophagitis  -     omeprazole (PrilOSEC) 40 MG capsule; Take 1 capsule by mouth Daily.  -     CBC & Differential  -     Comprehensive Metabolic Panel  -     XR Chest 2 View    Cough  -     XR Chest 2 View    Concerned about possible aspiration as well as what seems to be worsening reflux. He is to stop aleve and only take tylenol. Will get labs today and start him on omeprazole 40 mg daily. He can use pepto intermittently or tums. He will need fup with Tiffanie in a few weeks. He is likely to need GI consult as well if his symptoms do not start improving.     Return in about 27 days (around 2/25/2020).           "

## 2020-01-30 ENCOUNTER — ANESTHESIA EVENT (OUTPATIENT)
Dept: GASTROENTEROLOGY | Facility: HOSPITAL | Age: 45
End: 2020-01-30

## 2020-01-30 ENCOUNTER — ANESTHESIA (OUTPATIENT)
Dept: GASTROENTEROLOGY | Facility: HOSPITAL | Age: 45
End: 2020-01-30

## 2020-01-30 LAB
ALBUMIN SERPL-MCNC: 3.2 G/DL (ref 3.5–5.2)
ALBUMIN/GLOB SERPL: 0.9 G/DL
ALP SERPL-CCNC: 95 U/L (ref 39–117)
ALT SERPL-CCNC: 47 U/L (ref 1–41)
APPEARANCE FLD: ABNORMAL
AST SERPL-CCNC: 42 U/L (ref 1–40)
B PARAPERT DNA SPEC QL NAA+PROBE: NOT DETECTED
B PERT DNA SPEC QL NAA+PROBE: NOT DETECTED
BASOPHILS # BLD AUTO: 0.06 10*3/MM3 (ref 0–0.2)
BASOPHILS NFR BLD AUTO: 0.3 % (ref 0–1.5)
BILIRUB SERPL-MCNC: 0.4 MG/DL (ref 0.2–1.2)
BUN SERPL-MCNC: 12 MG/DL (ref 6–20)
BUN/CREAT SERPL: 16.4 (ref 7–25)
C PNEUM DNA NPH QL NAA+NON-PROBE: NOT DETECTED
CALCIUM SERPL-MCNC: 8.9 MG/DL (ref 8.6–10.5)
CHLORIDE SERPL-SCNC: 93 MMOL/L (ref 98–107)
CO2 SERPL-SCNC: 22.2 MMOL/L (ref 22–29)
COLOR FLD: ABNORMAL
CREAT SERPL-MCNC: 0.73 MG/DL (ref 0.76–1.27)
CRP SERPL-MCNC: 29.09 MG/DL (ref 0–0.5)
EOSINOPHIL # BLD AUTO: 0.02 10*3/MM3 (ref 0–0.4)
EOSINOPHIL NFR BLD AUTO: 0.1 % (ref 0.3–6.2)
ERYTHROCYTE [DISTWIDTH] IN BLOOD BY AUTOMATED COUNT: 13 % (ref 12.3–15.4)
FLUAV H1 2009 PAND RNA NPH QL NAA+PROBE: NOT DETECTED
FLUAV H1 HA GENE NPH QL NAA+PROBE: NOT DETECTED
FLUAV H3 RNA NPH QL NAA+PROBE: NOT DETECTED
FLUAV SUBTYP SPEC NAA+PROBE: NOT DETECTED
FLUBV RNA ISLT QL NAA+PROBE: NOT DETECTED
GIE STN SPEC: NORMAL
GLOBULIN SER CALC-MCNC: 3.6 GM/DL
GLUCOSE BLDC GLUCOMTR-MCNC: 120 MG/DL (ref 70–130)
GLUCOSE BLDC GLUCOMTR-MCNC: 136 MG/DL (ref 70–130)
GLUCOSE BLDC GLUCOMTR-MCNC: 154 MG/DL (ref 70–130)
GLUCOSE BLDC GLUCOMTR-MCNC: 170 MG/DL (ref 70–130)
GLUCOSE SERPL-MCNC: 40 MG/DL (ref 65–99)
HADV DNA SPEC NAA+PROBE: NOT DETECTED
HCOV 229E RNA SPEC QL NAA+PROBE: NOT DETECTED
HCOV HKU1 RNA SPEC QL NAA+PROBE: NOT DETECTED
HCOV NL63 RNA SPEC QL NAA+PROBE: NOT DETECTED
HCOV OC43 RNA SPEC QL NAA+PROBE: NOT DETECTED
HCT VFR BLD AUTO: 33.6 % (ref 37.5–51)
HGB BLD-MCNC: 11.1 G/DL (ref 13–17.7)
HMPV RNA NPH QL NAA+NON-PROBE: NOT DETECTED
HPIV1 RNA SPEC QL NAA+PROBE: NOT DETECTED
HPIV2 RNA SPEC QL NAA+PROBE: NOT DETECTED
HPIV3 RNA NPH QL NAA+PROBE: NOT DETECTED
HPIV4 P GENE NPH QL NAA+PROBE: NOT DETECTED
IMM GRANULOCYTES # BLD AUTO: 0.32 10*3/MM3 (ref 0–0.05)
IMM GRANULOCYTES NFR BLD AUTO: 1.6 % (ref 0–0.5)
LYMPHOCYTES # BLD AUTO: 1.04 10*3/MM3 (ref 0.7–3.1)
LYMPHOCYTES NFR BLD AUTO: 5.4 % (ref 19.6–45.3)
LYMPHOCYTES NFR FLD MANUAL: 8 %
M PNEUMO IGG SER IA-ACNC: NOT DETECTED
MCH RBC QN AUTO: 28.5 PG (ref 26.6–33)
MCHC RBC AUTO-ENTMCNC: 33 G/DL (ref 31.5–35.7)
MCV RBC AUTO: 86.2 FL (ref 79–97)
MONOCYTES # BLD AUTO: 1.13 10*3/MM3 (ref 0.1–0.9)
MONOCYTES NFR BLD AUTO: 5.8 % (ref 5–12)
NEUTROPHILS # BLD AUTO: 16.84 10*3/MM3 (ref 1.7–7)
NEUTROPHILS NFR BLD AUTO: 86.8 % (ref 42.7–76)
NEUTROPHILS NFR FLD MANUAL: 92 %
NRBC BLD AUTO-RTO: 0 /100 WBC (ref 0–0.2)
PLATELET # BLD AUTO: 502 10*3/MM3 (ref 140–450)
POTASSIUM SERPL-SCNC: 4.3 MMOL/L (ref 3.5–5.2)
PROT SERPL-MCNC: 6.8 G/DL (ref 6–8.5)
RBC # BLD AUTO: 3.9 10*6/MM3 (ref 4.14–5.8)
RBC # FLD AUTO: 5800 /MM3
RHINOVIRUS RNA SPEC NAA+PROBE: NOT DETECTED
RSV RNA NPH QL NAA+NON-PROBE: NOT DETECTED
SODIUM SERPL-SCNC: 132 MMOL/L (ref 136–145)
WBC # BLD AUTO: 19.41 10*3/MM3 (ref 3.4–10.8)
WBC # FLD AUTO: 570 /MM3

## 2020-01-30 PROCEDURE — 25010000002 PIPERACILLIN SOD-TAZOBACTAM PER 1 G: Performed by: INTERNAL MEDICINE

## 2020-01-30 PROCEDURE — 88312 SPECIAL STAINS GROUP 1: CPT | Performed by: INTERNAL MEDICINE

## 2020-01-30 PROCEDURE — 87102 FUNGUS ISOLATION CULTURE: CPT | Performed by: INTERNAL MEDICINE

## 2020-01-30 PROCEDURE — 88305 TISSUE EXAM BY PATHOLOGIST: CPT | Performed by: INTERNAL MEDICINE

## 2020-01-30 PROCEDURE — 0B9C8ZX DRAINAGE OF RIGHT UPPER LUNG LOBE, VIA NATURAL OR ARTIFICIAL OPENING ENDOSCOPIC, DIAGNOSTIC: ICD-10-PCS | Performed by: INTERNAL MEDICINE

## 2020-01-30 PROCEDURE — 63710000001 INSULIN LISPRO (HUMAN) PER 5 UNITS: Performed by: INTERNAL MEDICINE

## 2020-01-30 PROCEDURE — 87070 CULTURE OTHR SPECIMN AEROBIC: CPT | Performed by: INTERNAL MEDICINE

## 2020-01-30 PROCEDURE — 0100U HC BIOFIRE FILMARRAY RESP PANEL 2: CPT | Performed by: INTERNAL MEDICINE

## 2020-01-30 PROCEDURE — 87116 MYCOBACTERIA CULTURE: CPT | Performed by: INTERNAL MEDICINE

## 2020-01-30 PROCEDURE — 87176 TISSUE HOMOGENIZATION CULTR: CPT | Performed by: INTERNAL MEDICINE

## 2020-01-30 PROCEDURE — 0BDC8ZX EXTRACTION OF RIGHT UPPER LUNG LOBE, VIA NATURAL OR ARTIFICIAL OPENING ENDOSCOPIC, DIAGNOSTIC: ICD-10-PCS | Performed by: INTERNAL MEDICINE

## 2020-01-30 PROCEDURE — 99255 IP/OBS CONSLTJ NEW/EST HI 80: CPT | Performed by: INTERNAL MEDICINE

## 2020-01-30 PROCEDURE — 25010000002 PROPOFOL 10 MG/ML EMULSION: Performed by: NURSE ANESTHETIST, CERTIFIED REGISTERED

## 2020-01-30 PROCEDURE — 87206 SMEAR FLUORESCENT/ACID STAI: CPT | Performed by: INTERNAL MEDICINE

## 2020-01-30 PROCEDURE — 87071 CULTURE AEROBIC QUANT OTHER: CPT | Performed by: INTERNAL MEDICINE

## 2020-01-30 PROCEDURE — 87205 SMEAR GRAM STAIN: CPT | Performed by: INTERNAL MEDICINE

## 2020-01-30 PROCEDURE — 25010000003 AMPICILLIN-SULBACTAM PER 1.5 G: Performed by: INTERNAL MEDICINE

## 2020-01-30 PROCEDURE — 0BD48ZX EXTRACTION OF RIGHT UPPER LOBE BRONCHUS, VIA NATURAL OR ARTIFICIAL OPENING ENDOSCOPIC, DIAGNOSTIC: ICD-10-PCS | Performed by: INTERNAL MEDICINE

## 2020-01-30 PROCEDURE — 88112 CYTOPATH CELL ENHANCE TECH: CPT | Performed by: INTERNAL MEDICINE

## 2020-01-30 PROCEDURE — 82962 GLUCOSE BLOOD TEST: CPT

## 2020-01-30 PROCEDURE — 89051 BODY FLUID CELL COUNT: CPT | Performed by: INTERNAL MEDICINE

## 2020-01-30 PROCEDURE — 25010000002 ENOXAPARIN PER 10 MG: Performed by: INTERNAL MEDICINE

## 2020-01-30 RX ORDER — PROMETHAZINE HYDROCHLORIDE 25 MG/ML
12.5 INJECTION, SOLUTION INTRAMUSCULAR; INTRAVENOUS ONCE AS NEEDED
Status: DISCONTINUED | OUTPATIENT
Start: 2020-01-30 | End: 2020-01-30 | Stop reason: HOSPADM

## 2020-01-30 RX ORDER — SODIUM CHLORIDE 0.9 % (FLUSH) 0.9 %
10 SYRINGE (ML) INJECTION AS NEEDED
Status: DISCONTINUED | OUTPATIENT
Start: 2020-01-30 | End: 2020-01-31

## 2020-01-30 RX ORDER — LIDOCAINE HYDROCHLORIDE 20 MG/ML
INJECTION, SOLUTION INFILTRATION; PERINEURAL AS NEEDED
Status: DISCONTINUED | OUTPATIENT
Start: 2020-01-30 | End: 2020-01-30 | Stop reason: SURG

## 2020-01-30 RX ORDER — PROMETHAZINE HYDROCHLORIDE 25 MG/1
25 SUPPOSITORY RECTAL ONCE AS NEEDED
Status: DISCONTINUED | OUTPATIENT
Start: 2020-01-30 | End: 2020-01-30 | Stop reason: HOSPADM

## 2020-01-30 RX ORDER — PROMETHAZINE HYDROCHLORIDE 25 MG/1
25 TABLET ORAL ONCE AS NEEDED
Status: DISCONTINUED | OUTPATIENT
Start: 2020-01-30 | End: 2020-01-30 | Stop reason: HOSPADM

## 2020-01-30 RX ORDER — LIDOCAINE HYDROCHLORIDE 20 MG/ML
INJECTION, SOLUTION EPIDURAL; INFILTRATION; INTRACAUDAL; PERINEURAL AS NEEDED
Status: DISCONTINUED | OUTPATIENT
Start: 2020-01-30 | End: 2020-01-30 | Stop reason: HOSPADM

## 2020-01-30 RX ORDER — SODIUM CHLORIDE, SODIUM LACTATE, POTASSIUM CHLORIDE, CALCIUM CHLORIDE 600; 310; 30; 20 MG/100ML; MG/100ML; MG/100ML; MG/100ML
1000 INJECTION, SOLUTION INTRAVENOUS CONTINUOUS
Status: ACTIVE | OUTPATIENT
Start: 2020-01-30 | End: 2020-02-01

## 2020-01-30 RX ORDER — PROPOFOL 10 MG/ML
VIAL (ML) INTRAVENOUS AS NEEDED
Status: DISCONTINUED | OUTPATIENT
Start: 2020-01-30 | End: 2020-01-30 | Stop reason: SURG

## 2020-01-30 RX ADMIN — SODIUM CHLORIDE 3 G: 900 INJECTION INTRAVENOUS at 20:29

## 2020-01-30 RX ADMIN — TAZOBACTAM SODIUM AND PIPERACILLIN SODIUM 4.5 G: 500; 4 INJECTION, SOLUTION INTRAVENOUS at 08:42

## 2020-01-30 RX ADMIN — SODIUM CHLORIDE 125 ML/HR: 9 INJECTION, SOLUTION INTRAVENOUS at 22:22

## 2020-01-30 RX ADMIN — ENOXAPARIN SODIUM 40 MG: 40 INJECTION SUBCUTANEOUS at 20:29

## 2020-01-30 RX ADMIN — SODIUM CHLORIDE, POTASSIUM CHLORIDE, SODIUM LACTATE AND CALCIUM CHLORIDE 1000 ML: 600; 310; 30; 20 INJECTION, SOLUTION INTRAVENOUS at 10:12

## 2020-01-30 RX ADMIN — INSULIN LISPRO 2 UNITS: 100 INJECTION, SOLUTION INTRAVENOUS; SUBCUTANEOUS at 22:22

## 2020-01-30 RX ADMIN — PROPOFOL 180 MCG/KG/MIN: 10 INJECTION, EMULSION INTRAVENOUS at 11:27

## 2020-01-30 RX ADMIN — TAZOBACTAM SODIUM AND PIPERACILLIN SODIUM 4.5 G: 500; 4 INJECTION, SOLUTION INTRAVENOUS at 00:49

## 2020-01-30 RX ADMIN — SODIUM CHLORIDE 3 G: 900 INJECTION INTRAVENOUS at 15:03

## 2020-01-30 RX ADMIN — PROPOFOL 200 MG: 10 INJECTION, EMULSION INTRAVENOUS at 11:23

## 2020-01-30 RX ADMIN — LIDOCAINE HYDROCHLORIDE 60 MG: 20 INJECTION, SOLUTION INFILTRATION; PERINEURAL at 11:23

## 2020-01-30 RX ADMIN — PROPOFOL 150 MG: 10 INJECTION, EMULSION INTRAVENOUS at 11:27

## 2020-01-30 NOTE — ANESTHESIA PREPROCEDURE EVALUATION
Anesthesia Evaluation     Patient summary reviewed   NPO Solid Status: > 8 hours  NPO Liquid Status: > 8 hours           Airway   Mallampati: III  TM distance: <3 FB  Neck ROM: limited  Possible difficult intubation  Dental    (+) poor dentition    Pulmonary     breath sounds clear to auscultation  Cardiovascular   Exercise tolerance: good (4-7 METS)    Rhythm: regular  Rate: normal        Neuro/Psych  GI/Hepatic/Renal/Endo      Musculoskeletal     Abdominal    Substance History      OB/GYN          Other                        Anesthesia Plan    ASA 3     general       Anesthetic plan, all risks, benefits, and alternatives have been provided, discussed and informed consent has been obtained with: patient.

## 2020-01-30 NOTE — ANESTHESIA PROCEDURE NOTES
Airway  Date/Time: 1/30/2020 11:26 AM  Airway not difficult    General Information and Staff    Patient location during procedure: OR  Anesthesiologist: Ravi Ruiz MD  CRNA: Minda Guadarrama CRNA    Indications and Patient Condition  Indications for airway management: airway protection    Preoxygenated: yes  Mask difficulty assessment: 0 - not attempted    Final Airway Details  Final airway type: supraglottic airway      Successful airway: LMA  Size 4    Number of attempts at approach: 1  Assessment: lips, teeth, and gum same as pre-op and atraumatic intubation

## 2020-01-30 NOTE — ANESTHESIA POSTPROCEDURE EVALUATION
"Patient: Darvin Leonard    Procedure Summary     Date:  01/30/20 Room / Location:   [facility]    Anesthesia Start:  1112 Anesthesia Stop:  1157    Procedure:  BRONCHOSCOPY WITH RIGHT UPPER LOBE BAL AND BIOPSIES (N/A Bronchus) Diagnosis:      Surgeon:  Neri Aguilera MD Provider:  Ravi Ruiz MD    Anesthesia Type:  general ASA Status:  3          Anesthesia Type: general    Vitals  Vitals Value Taken Time   /67 1/30/2020 12:15 PM   Temp     Pulse 100 1/30/2020 12:15 PM   Resp 24 1/30/2020 12:15 PM   SpO2 95 % 1/30/2020 12:15 PM           Post Anesthesia Care and Evaluation      Comments: Patient discharged before being evaluated by an Anesthesiologist. No apparent complications per the record.  This case was not medically directed. I am completing this chart for medical records purposes; I personally have no medical involvement with this patient.    /67 (BP Location: Right arm)   Pulse 100   Temp 36.9 °C (98.4 °F) (Oral)   Resp 24   Ht 182.9 cm (72\")   Wt 106 kg (233 lb 6.4 oz)   SpO2 95%   BMI 31.65 kg/m²           "

## 2020-01-31 LAB
ALBUMIN SERPL-MCNC: 2.3 G/DL (ref 3.5–5.2)
ALBUMIN/GLOB SERPL: 0.5 G/DL
ALP SERPL-CCNC: 74 U/L (ref 39–117)
ALT SERPL W P-5'-P-CCNC: 43 U/L (ref 1–41)
ANION GAP SERPL CALCULATED.3IONS-SCNC: 13.3 MMOL/L (ref 5–15)
AST SERPL-CCNC: 33 U/L (ref 1–40)
BILIRUB SERPL-MCNC: 0.3 MG/DL (ref 0.2–1.2)
BUN BLD-MCNC: 5 MG/DL (ref 6–20)
BUN/CREAT SERPL: 8.2 (ref 7–25)
CALCIUM SPEC-SCNC: 8.5 MG/DL (ref 8.6–10.5)
CHLORIDE SERPL-SCNC: 99 MMOL/L (ref 98–107)
CO2 SERPL-SCNC: 23.7 MMOL/L (ref 22–29)
CREAT BLD-MCNC: 0.61 MG/DL (ref 0.76–1.27)
DEPRECATED RDW RBC AUTO: 40.4 FL (ref 37–54)
ERYTHROCYTE [DISTWIDTH] IN BLOOD BY AUTOMATED COUNT: 13 % (ref 12.3–15.4)
GFR SERPL CREATININE-BSD FRML MDRD: 144 ML/MIN/1.73
GLOBULIN UR ELPH-MCNC: 4.2 GM/DL
GLUCOSE BLD-MCNC: 161 MG/DL (ref 65–99)
GLUCOSE BLDC GLUCOMTR-MCNC: 173 MG/DL (ref 70–130)
GLUCOSE BLDC GLUCOMTR-MCNC: 182 MG/DL (ref 70–130)
GLUCOSE BLDC GLUCOMTR-MCNC: 240 MG/DL (ref 70–130)
GLUCOSE BLDC GLUCOMTR-MCNC: 329 MG/DL (ref 70–130)
HCT VFR BLD AUTO: 29.4 % (ref 37.5–51)
HGB BLD-MCNC: 9.9 G/DL (ref 13–17.7)
HIV1+2 AB SER QL: NORMAL
MCH RBC QN AUTO: 28.8 PG (ref 26.6–33)
MCHC RBC AUTO-ENTMCNC: 33.7 G/DL (ref 31.5–35.7)
MCV RBC AUTO: 85.5 FL (ref 79–97)
PLATELET # BLD AUTO: 332 10*3/MM3 (ref 140–450)
PMV BLD AUTO: 9.3 FL (ref 6–12)
POTASSIUM BLD-SCNC: 3.9 MMOL/L (ref 3.5–5.2)
PROT SERPL-MCNC: 6.5 G/DL (ref 6–8.5)
RBC # BLD AUTO: 3.44 10*6/MM3 (ref 4.14–5.8)
SODIUM BLD-SCNC: 136 MMOL/L (ref 136–145)
WBC NRBC COR # BLD: 15.07 10*3/MM3 (ref 3.4–10.8)

## 2020-01-31 PROCEDURE — 99232 SBSQ HOSP IP/OBS MODERATE 35: CPT | Performed by: INTERNAL MEDICINE

## 2020-01-31 PROCEDURE — 25010000003 AMPICILLIN-SULBACTAM PER 1.5 G: Performed by: INTERNAL MEDICINE

## 2020-01-31 PROCEDURE — G0432 EIA HIV-1/HIV-2 SCREEN: HCPCS | Performed by: INTERNAL MEDICINE

## 2020-01-31 PROCEDURE — 80053 COMPREHEN METABOLIC PANEL: CPT | Performed by: INTERNAL MEDICINE

## 2020-01-31 PROCEDURE — 63710000001 INSULIN LISPRO (HUMAN) PER 5 UNITS: Performed by: INTERNAL MEDICINE

## 2020-01-31 PROCEDURE — 82962 GLUCOSE BLOOD TEST: CPT

## 2020-01-31 PROCEDURE — 85027 COMPLETE CBC AUTOMATED: CPT | Performed by: INTERNAL MEDICINE

## 2020-01-31 PROCEDURE — 87205 SMEAR GRAM STAIN: CPT | Performed by: INTERNAL MEDICINE

## 2020-01-31 PROCEDURE — 25010000002 ENOXAPARIN PER 10 MG: Performed by: INTERNAL MEDICINE

## 2020-01-31 PROCEDURE — 99223 1ST HOSP IP/OBS HIGH 75: CPT | Performed by: NURSE PRACTITIONER

## 2020-01-31 PROCEDURE — 87070 CULTURE OTHR SPECIMN AEROBIC: CPT | Performed by: INTERNAL MEDICINE

## 2020-01-31 RX ADMIN — INSULIN LISPRO 7 UNITS: 100 INJECTION, SOLUTION INTRAVENOUS; SUBCUTANEOUS at 22:06

## 2020-01-31 RX ADMIN — SODIUM CHLORIDE 3 G: 900 INJECTION INTRAVENOUS at 17:27

## 2020-01-31 RX ADMIN — SODIUM CHLORIDE 3 G: 900 INJECTION INTRAVENOUS at 11:19

## 2020-01-31 RX ADMIN — SODIUM CHLORIDE 3 G: 900 INJECTION INTRAVENOUS at 04:55

## 2020-01-31 RX ADMIN — ENOXAPARIN SODIUM 40 MG: 40 INJECTION SUBCUTANEOUS at 20:58

## 2020-01-31 RX ADMIN — SODIUM CHLORIDE 3 G: 900 INJECTION INTRAVENOUS at 22:06

## 2020-01-31 RX ADMIN — INSULIN LISPRO 4 UNITS: 100 INJECTION, SOLUTION INTRAVENOUS; SUBCUTANEOUS at 12:26

## 2020-01-31 RX ADMIN — INSULIN LISPRO 2 UNITS: 100 INJECTION, SOLUTION INTRAVENOUS; SUBCUTANEOUS at 17:27

## 2020-02-01 VITALS
TEMPERATURE: 98.9 F | RESPIRATION RATE: 18 BRPM | DIASTOLIC BLOOD PRESSURE: 79 MMHG | HEIGHT: 72 IN | OXYGEN SATURATION: 95 % | HEART RATE: 94 BPM | SYSTOLIC BLOOD PRESSURE: 124 MMHG | WEIGHT: 238.2 LBS | BODY MASS INDEX: 32.26 KG/M2

## 2020-02-01 LAB
BACTERIA SPEC AEROBE CULT: NORMAL
BASOPHILS # BLD AUTO: 0.02 10*3/MM3 (ref 0–0.2)
BASOPHILS NFR BLD AUTO: 0.1 % (ref 0–1.5)
DEPRECATED RDW RBC AUTO: 39.7 FL (ref 37–54)
EOSINOPHIL # BLD AUTO: 0.31 10*3/MM3 (ref 0–0.4)
EOSINOPHIL NFR BLD AUTO: 2.1 % (ref 0.3–6.2)
ERYTHROCYTE [DISTWIDTH] IN BLOOD BY AUTOMATED COUNT: 13 % (ref 12.3–15.4)
GLUCOSE BLDC GLUCOMTR-MCNC: 212 MG/DL (ref 70–130)
GLUCOSE BLDC GLUCOMTR-MCNC: 250 MG/DL (ref 70–130)
GRAM STN SPEC: NORMAL
HCT VFR BLD AUTO: 30.3 % (ref 37.5–51)
HGB BLD-MCNC: 10 G/DL (ref 13–17.7)
IMM GRANULOCYTES # BLD AUTO: 0.4 10*3/MM3 (ref 0–0.05)
IMM GRANULOCYTES NFR BLD AUTO: 2.7 % (ref 0–0.5)
IRON 24H UR-MRATE: 19 MCG/DL (ref 59–158)
IRON SATN MFR SERPL: 10 % (ref 20–50)
LYMPHOCYTES # BLD AUTO: 1.18 10*3/MM3 (ref 0.7–3.1)
LYMPHOCYTES NFR BLD AUTO: 8.1 % (ref 19.6–45.3)
MCH RBC QN AUTO: 28 PG (ref 26.6–33)
MCHC RBC AUTO-ENTMCNC: 33 G/DL (ref 31.5–35.7)
MCV RBC AUTO: 84.9 FL (ref 79–97)
MONOCYTES # BLD AUTO: 0.72 10*3/MM3 (ref 0.1–0.9)
MONOCYTES NFR BLD AUTO: 4.9 % (ref 5–12)
NEUTROPHILS # BLD AUTO: 11.99 10*3/MM3 (ref 1.7–7)
NEUTROPHILS NFR BLD AUTO: 82.1 % (ref 42.7–76)
NRBC BLD AUTO-RTO: 0 /100 WBC (ref 0–0.2)
PLATELET # BLD AUTO: 351 10*3/MM3 (ref 140–450)
PMV BLD AUTO: 9.5 FL (ref 6–12)
RBC # BLD AUTO: 3.57 10*6/MM3 (ref 4.14–5.8)
TIBC SERPL-MCNC: 194 MCG/DL (ref 298–536)
TRANSFERRIN SERPL-MCNC: 130 MG/DL (ref 200–360)
WBC NRBC COR # BLD: 14.62 10*3/MM3 (ref 3.4–10.8)

## 2020-02-01 PROCEDURE — 82962 GLUCOSE BLOOD TEST: CPT

## 2020-02-01 PROCEDURE — 83540 ASSAY OF IRON: CPT | Performed by: INTERNAL MEDICINE

## 2020-02-01 PROCEDURE — 99232 SBSQ HOSP IP/OBS MODERATE 35: CPT | Performed by: INTERNAL MEDICINE

## 2020-02-01 PROCEDURE — 63710000001 INSULIN LISPRO (HUMAN) PER 5 UNITS: Performed by: INTERNAL MEDICINE

## 2020-02-01 PROCEDURE — 84466 ASSAY OF TRANSFERRIN: CPT | Performed by: INTERNAL MEDICINE

## 2020-02-01 PROCEDURE — 85025 COMPLETE CBC W/AUTO DIFF WBC: CPT | Performed by: INTERNAL MEDICINE

## 2020-02-01 PROCEDURE — 25010000003 AMPICILLIN-SULBACTAM PER 1.5 G: Performed by: INTERNAL MEDICINE

## 2020-02-01 PROCEDURE — 82136 AMINO ACIDS QUANT 2-5: CPT | Performed by: INTERNAL MEDICINE

## 2020-02-01 PROCEDURE — 83918 ORGANIC ACIDS TOTAL QUANT: CPT | Performed by: INTERNAL MEDICINE

## 2020-02-01 RX ORDER — AMOXICILLIN AND CLAVULANATE POTASSIUM 500; 125 MG/1; MG/1
1 TABLET, FILM COATED ORAL 3 TIMES DAILY
Qty: 90 TABLET | Refills: 1 | Status: SHIPPED | OUTPATIENT
Start: 2020-02-01 | End: 2020-03-13

## 2020-02-01 RX ADMIN — INSULIN LISPRO 4 UNITS: 100 INJECTION, SOLUTION INTRAVENOUS; SUBCUTANEOUS at 08:15

## 2020-02-01 RX ADMIN — SODIUM CHLORIDE 3 G: 900 INJECTION INTRAVENOUS at 10:11

## 2020-02-01 RX ADMIN — SODIUM CHLORIDE 3 G: 900 INJECTION INTRAVENOUS at 04:07

## 2020-02-01 RX ADMIN — INSULIN LISPRO 6 UNITS: 100 INJECTION, SOLUTION INTRAVENOUS; SUBCUTANEOUS at 11:57

## 2020-02-01 NOTE — PLAN OF CARE
Problem: Patient Care Overview  Goal: Plan of Care Review  Flowsheets (Taken 2/1/2020 0445)  Progress: no change  Plan of Care Reviewed With: patient  Outcome Summary: VSS. Patient placed on 2L oxygen overnight d/t desating into the mid to upper 80's. IV abx given as ordered. Plan is to d/c on abx for 6 weeks. Possible d/c soon. Will continue to monitor.     Problem: Sepsis/Septic Shock (Adult)  Goal: Signs and Symptoms of Listed Potential Problems Will be Absent, Minimized or Managed (Sepsis/Septic Shock)  Flowsheets (Taken 2/1/2020 0445)  Problems Present (Sepsis): glycemic control impaired; situational response; hypoxia/hypoxemia     Problem: Breathing Pattern Ineffective (Adult)  Goal: Identify Related Risk Factors and Signs and Symptoms  Flowsheets (Taken 2/1/2020 0445)  Related Risk Factors (Breathing Pattern Ineffective): underlying condition  Signs and Symptoms (Breathing Pattern Ineffective): other (see comments); breathing pattern altered     Problem: Breathing Pattern Ineffective (Adult)  Goal: Effective Oxygenation/Ventilation  Flowsheets (Taken 2/1/2020 0445)  Effective Oxygenation/Ventilation: making progress toward outcome     Problem: Breathing Pattern Ineffective (Adult)  Goal: Anxiety/Fear Reduction  Flowsheets (Taken 2/1/2020 0445)  Anxiety/Fear Reduction: making progress toward outcome     Problem: Pain, Acute (Adult)  Goal: Identify Related Risk Factors and Signs and Symptoms  Flowsheets (Taken 2/1/2020 0445)  Related Risk Factors (Acute Pain): patient perception  Signs and Symptoms (Acute Pain): verbalization of pain descriptors     Problem: Pain, Acute (Adult)  Goal: Acceptable Pain Control/Comfort Level  Flowsheets (Taken 2/1/2020 0445)  Acceptable Pain Control/Comfort Level: making progress toward outcome

## 2020-02-01 NOTE — PROGRESS NOTES
LOS: 3 days     Chief Complaint:  Follow-up RUL abscess    Interval History: Afebrile, states he feels well.  Continues to have a cough with occasional sputum production.  Overall feeling better.  Denies any abdominal pain nausea vomiting or diarrhea.  Tolerating antibiotics without a rash.    Vital Signs  Temp:  [98.5 °F (36.9 °C)-98.9 °F (37.2 °C)] 98.9 °F (37.2 °C)  Heart Rate:  [] 105  Resp:  [18-20] 18  BP: (124-156)/(79-91) 124/79    Physical Exam:  General: in, NAD   Cardiovascular: tachy, no LE edema  Respiratory:  Coarse breath sounds on the right  GI: Abdomen is soft, non-tender, non-distended  Skin: No rashes    Antibiotics:  Unasyn 3 g IV every 6 hours    LABS:  CBC, BMP, HIV Ab, micro, CRP reviewed today  Lab Results   Component Value Date    WBC 14.62 (H) 02/01/2020    HGB 10.0 (L) 02/01/2020    HCT 30.3 (L) 02/01/2020    MCV 84.9 02/01/2020     02/01/2020     Lab Results   Component Value Date    GLUCOSE 161 (H) 01/31/2020    CALCIUM 8.5 (L) 01/31/2020     01/31/2020    K 3.9 01/31/2020    CO2 23.7 01/31/2020    CL 99 01/31/2020    BUN 5 (L) 01/31/2020    CREATININE 0.61 (L) 01/31/2020    EGFRIFAFRI 141 01/29/2020    EGFRIFNONA 144 01/31/2020    BCR 8.2 01/31/2020    ANIONGAP 13.3 01/31/2020     Lab Results   Component Value Date    CRP 29.09 (H) 01/29/2020     HIV Ab negative    Microbiology:  1/29 BCx: NGTD x2  1/30 BAL Cx: Negative          AFB smear negative cultures pending           Fungal smear negative culture pending  1/31 SpCx: NGTD      Assessment/Plan   1. Right upper lobe abscess  2. Leukocytosis     White blood cell count continues to improve.  Continue Unasyn 3 g IV every 6 hours while in the hospital.  At discharge transition to Augmentin 500 mg p.o. every 8 hours x6 weeks (as long as sputum cultures from yesterday do not dictate otherwise).  Preliminary antibiotic stop date March 13.  ID follow-up arranged for March 13

## 2020-02-01 NOTE — DISCHARGE SUMMARY
Date of Discharge:  2/1/2020    Discharge Diagnoses:  1. Right upper lobe lung abscess likely secondary to aspiration of foreign body/popcorn  2. Obstructive sleep apnea  3. Acute hyponatremia  4. Anemia  5. Diabetes mellitus type 2  6. Obesity  7. Hypertension      Hospital Course  Patient is a 44 y.o. male presented with 3 weeks of cough after eating popcorn and possibly aspirating.  He went to his primary care physician and x-ray showed an abscess and he was admitted for evaluation and treatment he underwent bronchoscopy cultures been negative he has improved dramatically a currently denies any cough any shortness of breath any chest pain he is been seen by thoracic surgery and infectious disease.  The consensus is to treat him with antibiotics for another 6 weeks Augmentin 500 mg every 8 hours and follow-up with surgery and infectious disease after the antibiotics are completed with a follow-up CT scan of the chest to evaluate.  Also patient almost certainly has obstructive sleep apnea after being observed sleeping and symptomatology wise he is going to get a sleep study scheduled and he will follow-up with Dr. Aguilera after that is completed.  He had some mild hyponatremia probably related to his lung infection that is resolved he has had some mild anemia as well some of the studies are pending his iron studies look most like chronic disease.  We can ask him to follow-up with his primary care physician to review the labs and his anemia will need to be followed..  Also asked the primary care physician Dr. Machado to follow-up cultures there is still some cultures pending to ensure nothing further grows.  If anything grows then we would need to contact Dr. Romero infectious disease.    Procedures Performed  Procedure(s):  BRONCHOSCOPY WITH RIGHT UPPER LOBE BAL AND BIOPSIES  01/30 1110 BRONCHOSCOPY    Consults:   Consults     Date and Time Order Name Status Description    1/30/2020 1300 Inpatient  Thoracic Surgery Consult Completed     1/30/2020 0922 Inpatient Infectious Diseases Consult Completed     1/29/2020 1859 Pulmonology (on-call MD unless specified) Completed           Pertinent Test Results:   Labs:  Results from last 7 days   Lab Units 01/31/20  0516 01/29/20  1701 01/29/20  1446   GLUCOSE mg/dL 161* 49*  --    SODIUM mmol/L 136 128* 132*   POTASSIUM mmol/L 3.9 3.9 4.3   TOTAL CO2 mmol/L  --   --  22.2   CO2 mmol/L 23.7 20.8*  --    CHLORIDE mmol/L 99 91* 93*   ANION GAP mmol/L 13.3 16.2*  --    CREATININE mg/dL 0.61* 0.71* 0.73*   BUN mg/dL 5* 12 12   BUN / CREAT RATIO  8.2 16.9 16.4   CALCIUM mg/dL 8.5* 9.1 8.9   EGFR IF NONAFRICN AM mL/min/1.73 144 121 117   ALK PHOS U/L 74 86 95   TOTAL PROTEIN g/dL 6.5 7.5  --    ALT (SGPT) U/L 43* 44* 47*   AST (SGOT) U/L 33 36 42*   BILIRUBIN mg/dL 0.3 0.4 0.4   ALBUMIN g/dL 2.30* 3.10* 3.20*   GLOBULIN gm/dL 4.2 4.4  --    A/G RATIO g/dL  --   --  0.9     Estimated Creatinine Clearance: 196.3 mL/min (A) (by C-G formula based on SCr of 0.61 mg/dL (L)).      Results from last 7 days   Lab Units 02/01/20  0507 01/31/20  0516 01/29/20  1701 01/29/20  1446   WBC 10*3/mm3 14.62* 15.07* 17.91* 19.41*   RBC 10*6/mm3 3.57* 3.44* 3.96* 3.90*   HEMOGLOBIN g/dL 10.0* 9.9* 11.1* 11.1*   HEMATOCRIT % 30.3* 29.4* 33.8* 33.6*   MCV fL 84.9 85.5 85.4 86.2   MCH pg 28.0 28.8 28.0 28.5   MCHC g/dL 33.0 33.7 32.8 33.0   RDW % 13.0 13.0 13.1 13.0   RDW-SD fl 39.7 40.4 41.1  --    MPV fL 9.5 9.3 9.5  --    PLATELETS 10*3/mm3 351 332 456* 502*   NEUTROPHIL % % 82.1*  --  86.2* 86.8*   LYMPHOCYTE % % 8.1*  --  6.5* 5.4*   MONOCYTES % % 4.9*  --  5.5 5.8   EOSINOPHIL % % 2.1  --  0.1* 0.1*   BASOPHIL % % 0.1  --  0.2 0.3   IMM GRAN % % 2.7*  --  1.5*  --    NEUTROS ABS 10*3/mm3 11.99*  --  15.46* 16.84*   LYMPHS ABS 10*3/mm3 1.18  --  1.16 1.04   MONOS ABS 10*3/mm3 0.72  --  0.98* 1.13*   EOS ABS 10*3/mm3 0.31  --  0.01 0.02   BASOS ABS 10*3/mm3 0.02  --  0.04 0.06   IMMATURE GRANS  "(ABS) 10*3/mm3 0.40*  --  0.26*  --    NRBC /100 WBC 0.0  --  0.0 0.0                     Results from last 7 days   Lab Units 01/29/20  1706 01/29/20  1701   LACTATE mmol/L 0.9  --    PROCALCITONIN ng/mL  --  0.42*           Imaging Results (Last 72 Hours)     Procedure Component Value Units Date/Time    CT Chest With Contrast [[id number]] Collected:  01/29/20 1917     Updated:  01/29/20 1944    Narrative:       CT CHEST W CONTRAST-     Radiation dose reduction techniques were utilized, including automated  exposure control and exposure modulation based on body size.     CLINICAL: Progressive, productive cough over the prior 2 weeks, initial  episode of vomiting after eating \"popcorn\"     FINDINGS: There is a sizable area of consolidation demonstrated within  the right upper lobe. Centrally there is an air-fluid cavity measuring 6  x 6 x 7 cm. This is most worrisome for lung abscess. There is truncation  of the right upper lobe bronchus leading to the area of consolidation  and at this location I see no definite foreign body. Enlarged right  hilar and right paratracheal lymph nodes. Cardiac size within normal  limits, no pericardial or esophageal abnormality. The diameter of the  thoracic aorta is within normal limits. The left lung is clear. Located  within the right middle lobe periphery there are several reticular  nodules arranged in a tree-in-bud configuration, suggesting bronchiolar  inflammation/infection with mucous plugging. There is a very small focus  of infiltrate demonstrated within the right lower lobe seen on image  #58. It measures approximately 10 mm.     CONCLUSION: Sizable area of consolidation demonstrated within the right  upper lobe and given the patient's history, most compatible with  pneumonia. There is a central air-fluid cavity worrisome for abscess.  Tiny reticular nodules seen within the right middle lobe as well as a  tiny focus of infiltrate within the right lower lobe. The left lung " is  clear. There is right hilar and right paratracheal adenopathy.     Findings of this report called to Dr. Garcia in the emergency room at  the time of completion 6:55 PM.        This report was finalized on 1/29/2020 7:40 PM by Dr. Moe Arnold M.D.           Results for orders placed during the hospital encounter of 12/16/19   Adult Transthoracic Echo Complete W/ Cont if Necessary Per Protocol    Narrative · Estimated EF appears to be in the range of 61 - 65%  · Left ventricular wall thickness is consistent with mild concentric   hypertrophy.  · Left ventricular diastolic dysfunction is noted (grade I) consistent   with impaired relaxation  · Normal right ventricular cavity size and systolic function noted.  · The mitral valve leaflets are thickened.  · There is no evidence of pericardial effusion.            Condition on Discharge: Much improved    Vital Signs  Temp:  [98.5 °F (36.9 °C)-98.9 °F (37.2 °C)] 98.9 °F (37.2 °C)  Heart Rate:  [] 94  Resp:  [18-20] 18  BP: (124-156)/(79-91) 124/79    Physical Exam:  General Appearance: Well-developed obese white male he is resting comfortably in bed he does not appear in any acute distress, he is begging to go home says he feels fantastic.  Eyes: Conjunctiva are clear and anicteric pupils are equal  ENT: Mucous membranes are moist no erythema or exudates nasal septum midline and oral next is a Mallampati type III  Neck: Obese no palpable adenopathy no visible jugular venous distention.  Trachea midline  Lungs: Clear no wheezes no rales no rhonchi nonlabored symmetric expansion no dullness on percussion  Cardiac: Regular rate rhythm no murmur no gallop  Abdomen: Soft nontender no palpable hepatosplenomegaly or masses  : Not examined  Musc/Skel: Grossly normal  Skin: No jaundice no petechiae skin is warm and dry to touch  Neuro: He is alert oriented cooperative following commands grossly intact  Extremities/P Vascular: No clubbing no cyanosis no edema  palpable radial and dorsalis pedis pulses bilaterally  MSE: Seems to be in very good spirits      Discharge Disposition  Home or Self Care    Discharge Medications     Discharge Medications      New Medications      Instructions Start Date   amoxicillin-clavulanate 500-125 MG per tablet  Commonly known as:  AUGMENTIN   500 mg, Oral, 3 Times Daily         Changes to Medications      Instructions Start Date   lisinopril 20 MG tablet  Commonly known as:  PRINIVIL,ZESTRIL  What changed:  when to take this   TAKE 1 TABLET BY MOUTH ONCE DAILY         Continue These Medications      Instructions Start Date   aspirin 81 MG EC tablet   81 mg, Oral, Daily      atorvastatin 10 MG tablet  Commonly known as:  LIPITOR   10 mg, Oral, Daily      Empagliflozin 10 MG tablet  Commonly known as:  JARDIANCE   10 mg, Oral, Daily      FREESTYLE GEORGINA 14 DAY SENSOR misc   1 Cartridge, Does not apply, Every 14 Days      FREESTYLE GEORGINA READER device   1 Device, Does not apply, Daily      glipizide 10 MG tablet  Commonly known as:  GLUCOTROL   10 mg, Oral, 2 Times Daily Before Meals      metFORMIN 1000 MG tablet  Commonly known as:  GLUCOPHAGE   1,000 mg, Oral, 2 Times Daily With Meals      omeprazole 40 MG capsule  Commonly known as:  PrilOSEC   40 mg, Oral, Daily         Stop These Medications    naproxen sodium 220 MG tablet  Commonly known as:  ALEVE            Discharge Diet: Regular    Activity at Discharge:     Follow-up Appointments  Future Appointments   Date Time Provider Department Center   2/28/2020  3:45 PM Rachell Hagan PA-C [facility]   3/11/2020  2:30 PM [facility]   3/13/2020  1:50 PM Garland Romero MD [facility] None   3/13/2020  2:30 PM Tana Hernandez APRN [facility]   3/23/2020 10:30 AM [facility]   4/9/2020  9:15 AM Pooja Sullivan MD [facility] None     Additional Instructions for the Follow-ups that You Need to Schedule     CT Chest Without  Contrast    Mar 18, 2020 (Approximate)            Test Results Pending at Discharge   Order Current Status    Anemia, Megaloblastic, Serum In process    Fungus Culture - Lavage, Lung, Right Upper Lobe In process    Non-gynecologic Cytology In process    AFB Culture - Lavage, Lung, Right Upper Lobe Preliminary result    Blood Culture - Blood, Arm, Right Preliminary result    Blood Culture - Blood, Arm, Right Preliminary result    Respiratory Culture - Sputum, Cough Preliminary result    Tissue / Bone Culture - Tissue, Lung, Right Upper Lobe Preliminary result           Laureano Desai MD  02/01/20  11:53 AM    Time:

## 2020-02-02 LAB
BACTERIA SPEC AEROBE CULT: NORMAL
BACTERIA SPEC RESP CULT: NORMAL
GRAM STN SPEC: NORMAL

## 2020-02-03 DIAGNOSIS — G47.34 NOCTURNAL OXYGEN DESATURATION: ICD-10-CM

## 2020-02-03 DIAGNOSIS — R06.83 SNORES: Primary | ICD-10-CM

## 2020-02-03 LAB
BACTERIA SPEC AEROBE CULT: NORMAL
BACTERIA SPEC AEROBE CULT: NORMAL
CYTO UR: NORMAL
LAB AP CASE REPORT: NORMAL
LAB AP CLINICAL INFORMATION: NORMAL
PATH REPORT.FINAL DX SPEC: NORMAL
PATH REPORT.GROSS SPEC: NORMAL

## 2020-02-03 NOTE — PROGRESS NOTES
Case Management Discharge Note      Final Note: Pt was dc'd home    Provided post acute provider list?: N/A    Destination      No service has been selected for the patient.      Durable Medical Equipment      No service has been selected for the patient.      Dialysis/Infusion      No service has been selected for the patient.      Home Medical Care      No service has been selected for the patient.      Therapy      No service has been selected for the patient.      Community Resources      No service has been selected for the patient.        Transportation Services  Private: Car    Final Discharge Disposition Code: 01 - home or self-care

## 2020-02-03 NOTE — PROGRESS NOTES
Patient states that he is a lot better- he has to continue Augmentin for 6 weeks and his CT is scheduled for after then as well. He says they told him in the hospital that his oxygen dropped while he was sleeping and they recommended a sleep study. He would like to have this done. He says thanks for checking on him.

## 2020-02-05 ENCOUNTER — TELEPHONE (OUTPATIENT)
Dept: SURGERY | Facility: CLINIC | Age: 45
End: 2020-02-05

## 2020-02-05 NOTE — TELEPHONE ENCOUNTER
"Called and spoke with patient about coming into the office for a CT and appointment sooner per the request of infectious disease (Dr. Romero), due to patient being on medication after hospital stay. Patient stated that he \"works, and cannot take the time off to come in for those appointments\". He also stated that he would call back when he had completed the medication from Dr. Romero and reschedule the CT and follow up with Dr. Lopez. Attempted again to let patient know that this was coordinated care with infectious disease, but he still wanted to cancel CT and office follow up at this time.     Will send a message to our APRN, Antonia De León, to let her know of the patient's decision.   "

## 2020-02-06 LAB
2ME-CITRATE SERPL-MCNC: 154 NMOL/L (ref 60–228)
CYSTATHIONIN SERPL-SCNC: 205 NMOL/L (ref 44–342)
HCYS SERPL-SCNC: 6.6 UMOL/L (ref 5.1–13.9)
Lab: NORMAL
METHYLMALONATE SERPL-SCNC: 182 NMOL/L (ref 0–378)

## 2020-02-14 ENCOUNTER — DOCUMENTATION (OUTPATIENT)
Dept: INFECTIOUS DISEASES | Facility: CLINIC | Age: 45
End: 2020-02-14

## 2020-02-14 DIAGNOSIS — J85.1 ABSCESS OF RIGHT LUNG WITH PNEUMONIA, UNSPECIFIED PART OF LUNG (HCC): Primary | ICD-10-CM

## 2020-02-14 NOTE — PROGRESS NOTES
I was notified that Mr. Leonard cancelled his CT and appt with thoracic surgery. I called the patient to clarify as to why and to check on his clinical condition. I left a VM for him to return my call.     I have ordered a CT chest non-contrast for 3/13 which is the day that he sees me. I will review it and also alert the thoracic surgeons that it has been done to obtain their opinion.

## 2020-02-17 ENCOUNTER — RESULTS ENCOUNTER (OUTPATIENT)
Dept: INTERNAL MEDICINE | Facility: CLINIC | Age: 45
End: 2020-02-17

## 2020-02-17 DIAGNOSIS — I10 ESSENTIAL HYPERTENSION: ICD-10-CM

## 2020-02-17 DIAGNOSIS — E11.9 TYPE 2 DIABETES MELLITUS WITHOUT COMPLICATION, WITHOUT LONG-TERM CURRENT USE OF INSULIN (HCC): ICD-10-CM

## 2020-02-19 ENCOUNTER — TELEPHONE (OUTPATIENT)
Dept: INFECTIOUS DISEASES | Facility: CLINIC | Age: 45
End: 2020-02-19

## 2020-02-19 NOTE — TELEPHONE ENCOUNTER
KIRSTIN for patient requesting that he contact the office so that I may let him know about his CT rosendo on 03/13 @ 11:00 arrival of 10:45.

## 2020-02-23 DIAGNOSIS — I10 ESSENTIAL HYPERTENSION: ICD-10-CM

## 2020-02-24 RX ORDER — LISINOPRIL 20 MG/1
20 TABLET ORAL
Qty: 30 TABLET | Refills: 3 | Status: SHIPPED | OUTPATIENT
Start: 2020-02-24 | End: 2020-03-13 | Stop reason: SDUPTHER

## 2020-02-27 LAB — FUNGUS WND CULT: NORMAL

## 2020-03-12 LAB
ALBUMIN SERPL-MCNC: 4.1 G/DL (ref 3.5–5.2)
ALBUMIN/GLOB SERPL: 1.3 G/DL
ALP SERPL-CCNC: 62 U/L (ref 39–117)
ALT SERPL-CCNC: 22 U/L (ref 1–41)
AST SERPL-CCNC: 17 U/L (ref 1–40)
BILIRUB SERPL-MCNC: 0.4 MG/DL (ref 0.2–1.2)
BUN SERPL-MCNC: 9 MG/DL (ref 6–20)
BUN/CREAT SERPL: 12.2 (ref 7–25)
CALCIUM SERPL-MCNC: 9.7 MG/DL (ref 8.6–10.5)
CHLORIDE SERPL-SCNC: 98 MMOL/L (ref 98–107)
CO2 SERPL-SCNC: 27.7 MMOL/L (ref 22–29)
CREAT SERPL-MCNC: 0.74 MG/DL (ref 0.76–1.27)
GLOBULIN SER CALC-MCNC: 3.1 GM/DL
GLUCOSE SERPL-MCNC: 92 MG/DL (ref 65–99)
HBA1C MFR BLD: 6.6 % (ref 4.8–5.6)
MYCOBACTERIUM SPEC CULT: NORMAL
NIGHT BLUE STAIN TISS: NORMAL
POTASSIUM SERPL-SCNC: 4 MMOL/L (ref 3.5–5.2)
PROT SERPL-MCNC: 7.2 G/DL (ref 6–8.5)
SODIUM SERPL-SCNC: 139 MMOL/L (ref 136–145)

## 2020-03-12 NOTE — PROGRESS NOTES
Subjective   Chief Complaint   Patient presents with   • Hypertension   • Diabetes       History of Present Illness   44 y.o. male presents for follow up regarding HTN, DM2, DERIC and hospital follow up for pulmonary abscess for which he completed 6 weeks  Augmentin 500 mg tid. He was to follow up with Dr. Aguilera (pulm) and ID (Dr. Romero) and have repeat CT chest.    Has made dietary changes--cutting out junk food. Has increased vegetables and choosing leaner meats. Doesn't have a regular exercise regimen but hoping to get started as the weather improves. Not checking his BG. No lows lately. No visual changes. Has lost 17# since December.      Patient Active Problem List   Diagnosis   • Hypertension   • Diabetes mellitus (CMS/[facility])   • Class 1 obesity without serious comorbidity with body mass index (BMI) of 33.0 to 33.9 in adult   • Hypoglycemia       No Known Allergies    Current Outpatient Medications on File Prior to Visit   Medication Sig Dispense Refill   • [DISCONTINUED] aspirin 81 MG EC tablet Take 81 mg by mouth Daily.     • [DISCONTINUED] atorvastatin (LIPITOR) 10 MG tablet Take 1 tablet by mouth Daily. 30 tablet 3   • [DISCONTINUED] Continuous Blood Gluc  (FREESTYLE GEORGINA READER) device 1 Device Daily. 1 Device 0   • [DISCONTINUED] Continuous Blood Gluc Sensor (FREESTYLE GEORGINA 14 DAY SENSOR) misc 1 Cartridge Every 14 (Fourteen) Days. 4 each 11   • [DISCONTINUED] Empagliflozin (JARDIANCE) 10 MG tablet Take 10 mg by mouth Daily for 120 days. 30 tablet 3   • [DISCONTINUED] glipizide (GLUCOTROL) 10 MG tablet Take 1 tablet by mouth 2 (Two) Times a Day Before Meals. 60 tablet 2   • [DISCONTINUED] lisinopril (PRINIVIL,ZESTRIL) 20 MG tablet Take 1 tablet by mouth every night at bedtime. 30 tablet 3   • [DISCONTINUED] metFORMIN (GLUCOPHAGE) 1000 MG tablet Take 1 tablet by mouth 2 (Two) Times a Day With Meals. 60 tablet 3   • [DISCONTINUED] omeprazole (PrilOSEC) 40 MG capsule Take 1 capsule by mouth  Daily. 90 capsule 1   • [DISCONTINUED] amoxicillin-clavulanate (AUGMENTIN) 500-125 MG per tablet Take 1 tablet by mouth 3 (Three) Times a Day for 42 days. 90 tablet 1     No current facility-administered medications on file prior to visit.        Past Medical History:   Diagnosis Date   • Diabetes mellitus (CMS/HCC)    • Fatigue    • GERD (gastroesophageal reflux disease)    • Headache    • Hypertension    • Knee pain    • Low back pain    • Neck pain    • Sleep apnea        Family History   Problem Relation Age of Onset   • Diabetes Maternal Grandmother        Social History     Socioeconomic History   • Marital status:      Spouse name: Not on file   • Number of children: Not on file   • Years of education: Not on file   • Highest education level: Not on file   Tobacco Use   • Smoking status: Never Smoker   Substance and Sexual Activity   • Alcohol use: Yes     Frequency: Never     Comment: socially    • Drug use: No   • Sexual activity: Defer       Past Surgical History:   Procedure Laterality Date   • BRONCHOSCOPY N/A 1/30/2020    Procedure: BRONCHOSCOPY WITH RIGHT UPPER LOBE BAL AND BIOPSIES;  Surgeon: Neri Aguilera MD;  Location: [facility] ENDOSCOPY;  Service: Pulmonary       The following portions of the patient's history were reviewed and updated as appropriate: problem list, allergies, current medications, past medical history and past social history.    Review of Systems   Respiratory: Negative for shortness of breath.    Cardiovascular: Negative for chest pain.       Immunization History   Administered Date(s) Administered   • Flu Vaccine Quad PF >36MO 10/21/2017   • Hepatitis B Vaccine Adult IM 05/24/2019, 07/05/2019, 11/22/2019   • Pneumococcal Conjugate 13-Valent (PCV13) 09/28/2018   • Pneumococcal Polysaccharide (PPSV23) 09/04/2015   • Td 09/01/2004   • Tdap 08/22/2014   • flucelvax quad pfs =>4 YRS 12/02/2019       Objective   Vitals:    03/13/20 1039 03/13/20 1057   BP:  112/78   Pulse:  70  "  Resp:  16   Weight: 104 kg (230 lb)    Height: 182.9 cm (72\")      Body mass index is 31.19 kg/m².  Physical Exam   Constitutional: He is oriented to person, place, and time. He appears well-developed and well-nourished.   HENT:   Head: Normocephalic and atraumatic.   Cardiovascular: Normal rate, regular rhythm, S1 normal, S2 normal, normal heart sounds and intact distal pulses.   Pulmonary/Chest: Effort normal and breath sounds normal.   Musculoskeletal: He exhibits no edema.   Neurological: He is alert and oriented to person, place, and time.   Skin: Skin is warm and dry.   Psychiatric: He has a normal mood and affect.   Vitals reviewed.      Procedures    Assessment/Plan   Darvin was seen today for hypertension and diabetes.    Diagnoses and all orders for this visit:    Type 2 diabetes mellitus without complication, without long-term current use of insulin (CMS/[facility])  Comments:  A1C well controlled; continue LSM. Advised to schedule eye exam. No changes at this time.   Orders:  -     Comprehensive Metabolic Panel; Future  -     Hemoglobin A1c; Future  -     Lipid Panel With / Chol / HDL Ratio; Future  -     Microalbumin / Creatinine Urine Ratio - Urine, Clean Catch; Future  -     metFORMIN (GLUCOPHAGE) 1000 MG tablet; Take 1 tablet by mouth 2 (Two) Times a Day With Meals.  -     glipizide (GLUCOTROL) 10 MG tablet; Take 1 tablet by mouth 2 (Two) Times a Day Before Meals.  -     Empagliflozin (JARDIANCE) 10 MG tablet; Take 10 mg by mouth Daily for 120 days.  -     atorvastatin (LIPITOR) 10 MG tablet; Take 1 tablet by mouth Daily.    Class 1 obesity without serious comorbidity with body mass index (BMI) of 33.0 to 33.9 in adult, unspecified obesity type  Comments:  Thrilled with 17# weighty loss. Advised continued weight loss via dietary changes and 150 minutes aerobic activity daily.       Essential hypertension  Comments:  BP at goal. No changes at this time.   Orders:  -     lisinopril (PRINIVIL,ZESTRIL) 20 " MG tablet; Take 1 tablet by mouth every night at bedtime.    Abscess of upper lobe of right lung with pneumonia (CMS/HCC)  Comments:  Will follow up with Dr. Romero. Had follow up CT this am.     Gastroesophageal reflux disease without esophagitis  Comments:  Omeprazole has helped tremendously.  Orders:  -     omeprazole (PrilOSEC) 40 MG capsule; Take 1 capsule by mouth Daily.    Records reviewed include previous OV with myself, hospital records,labs and imaging.      Return in about 3 months (around 6/13/2020) for Lab B4 FUP.

## 2020-03-13 ENCOUNTER — OFFICE VISIT (OUTPATIENT)
Dept: INTERNAL MEDICINE | Facility: CLINIC | Age: 45
End: 2020-03-13

## 2020-03-13 ENCOUNTER — OFFICE VISIT (OUTPATIENT)
Dept: INFECTIOUS DISEASES | Facility: CLINIC | Age: 45
End: 2020-03-13

## 2020-03-13 ENCOUNTER — HOSPITAL ENCOUNTER (OUTPATIENT)
Dept: CT IMAGING | Facility: HOSPITAL | Age: 45
Discharge: HOME | End: 2020-03-13
Admitting: INTERNAL MEDICINE

## 2020-03-13 VITALS
BODY MASS INDEX: 31.15 KG/M2 | WEIGHT: 230 LBS | RESPIRATION RATE: 16 BRPM | DIASTOLIC BLOOD PRESSURE: 78 MMHG | HEIGHT: 72 IN | SYSTOLIC BLOOD PRESSURE: 112 MMHG | HEART RATE: 70 BPM

## 2020-03-13 VITALS
TEMPERATURE: 97.6 F | SYSTOLIC BLOOD PRESSURE: 121 MMHG | DIASTOLIC BLOOD PRESSURE: 80 MMHG | WEIGHT: 230 LBS | HEART RATE: 112 BPM | HEIGHT: 72 IN | BODY MASS INDEX: 31.15 KG/M2

## 2020-03-13 DIAGNOSIS — J85.1 ABSCESS OF UPPER LOBE OF RIGHT LUNG WITH PNEUMONIA (HCC): ICD-10-CM

## 2020-03-13 DIAGNOSIS — J85.1 ABSCESS OF LOWER LOBE OF RIGHT LUNG WITH PNEUMONIA (HCC): Primary | ICD-10-CM

## 2020-03-13 DIAGNOSIS — E66.9 CLASS 1 OBESITY WITHOUT SERIOUS COMORBIDITY WITH BODY MASS INDEX (BMI) OF 33.0 TO 33.9 IN ADULT, UNSPECIFIED OBESITY TYPE: ICD-10-CM

## 2020-03-13 DIAGNOSIS — I10 ESSENTIAL HYPERTENSION: ICD-10-CM

## 2020-03-13 DIAGNOSIS — K21.9 GASTROESOPHAGEAL REFLUX DISEASE WITHOUT ESOPHAGITIS: ICD-10-CM

## 2020-03-13 DIAGNOSIS — E11.9 CONTROLLED TYPE 2 DIABETES MELLITUS WITHOUT COMPLICATION, WITHOUT LONG-TERM CURRENT USE OF INSULIN (HCC): ICD-10-CM

## 2020-03-13 DIAGNOSIS — E11.9 TYPE 2 DIABETES MELLITUS WITHOUT COMPLICATION, WITHOUT LONG-TERM CURRENT USE OF INSULIN (HCC): Primary | ICD-10-CM

## 2020-03-13 DIAGNOSIS — J85.1 ABSCESS OF RIGHT LUNG WITH PNEUMONIA, UNSPECIFIED PART OF LUNG (HCC): ICD-10-CM

## 2020-03-13 DIAGNOSIS — Z79.2 LONG TERM (CURRENT) USE OF ANTIBIOTICS: ICD-10-CM

## 2020-03-13 PROCEDURE — 99214 OFFICE O/P EST MOD 30 MIN: CPT | Performed by: INTERNAL MEDICINE

## 2020-03-13 PROCEDURE — 99214 OFFICE O/P EST MOD 30 MIN: CPT | Performed by: NURSE PRACTITIONER

## 2020-03-13 PROCEDURE — 71250 CT THORAX DX C-: CPT

## 2020-03-13 RX ORDER — OMEPRAZOLE 40 MG/1
40 CAPSULE, DELAYED RELEASE ORAL DAILY
Qty: 30 CAPSULE | Refills: 5 | Status: SHIPPED | OUTPATIENT
Start: 2020-03-13 | End: 2020-07-23 | Stop reason: SDUPTHER

## 2020-03-13 RX ORDER — LISINOPRIL 20 MG/1
20 TABLET ORAL
Qty: 30 TABLET | Refills: 5 | Status: SHIPPED | OUTPATIENT
Start: 2020-03-13 | End: 2020-07-23 | Stop reason: SDUPTHER

## 2020-03-13 RX ORDER — ATORVASTATIN CALCIUM 10 MG/1
10 TABLET, FILM COATED ORAL DAILY
Qty: 30 TABLET | Refills: 5 | Status: SHIPPED | OUTPATIENT
Start: 2020-03-13 | End: 2020-06-15

## 2020-03-13 RX ORDER — GLIPIZIDE 10 MG/1
10 TABLET ORAL
Qty: 60 TABLET | Refills: 5 | Status: SHIPPED | OUTPATIENT
Start: 2020-03-13 | End: 2020-07-23 | Stop reason: SDUPTHER

## 2020-03-13 NOTE — PROGRESS NOTES
"CC: f/u Right upper lobe abscess and pneumonia    HPI: Darvin Leonard is a 44 y.o. male here for  f/u right upper lobe abscess and pneumonia. He has now completed a 6-week course of Augmentin 500-125 mg PO q8h. He has no respiratory symptoms. No cough or dyspnea on exertion. No fever. He feels back to his usual self. He has lost weight and his A1c is down to 6.6% about which he is very happy.     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 and [employer]     Family History:  No 1st degree relatives w/ lung abscesses     Antibiotic allergies and intolerances:  None    Medications:   Current Outpatient Medications:   •  atorvastatin (LIPITOR) 10 MG tablet, Take 1 tablet by mouth Daily., Disp: 30 tablet, Rfl: 5  •  Empagliflozin (JARDIANCE) 10 MG tablet, Take 10 mg by mouth Daily for 120 days., Disp: 30 tablet, Rfl: 5  •  glipizide (GLUCOTROL) 10 MG tablet, Take 1 tablet by mouth 2 (Two) Times a Day Before Meals., Disp: 60 tablet, Rfl: 5  •  lisinopril (PRINIVIL,ZESTRIL) 20 MG tablet, Take 1 tablet by mouth every night at bedtime., Disp: 30 tablet, Rfl: 5  •  metFORMIN (GLUCOPHAGE) 1000 MG tablet, Take 1 tablet by mouth 2 (Two) Times a Day With Meals., Disp: 60 tablet, Rfl: 5  •  omeprazole (PrilOSEC) 40 MG capsule, Take 1 capsule by mouth Daily., Disp: 30 capsule, Rfl: 5      OBJECTIVE:  /80   Pulse 112   Temp 97.6 °F (36.4 °C)   Ht 182.9 cm (72.01\")   Wt 104 kg (230 lb)   BMI 31.19 kg/m²     General: awake, alert, NAD   Eyes: no scleral icterus  ENT: MMM, OP clear, no thrush.   Cardiovascular: NR  Respiratory: lungs are clear; " no rales or wheezing; on room air  GI: Abdomen is soft, non-tender, non-distended  : no Gonzalez catheter present  Skin: No rashes  Neurological: Alert and oriented x 3  Psychiatric: Normal mood and affect     DIAGNOSTICS:  A1c, CMP reviewed today  Lab Results   Component Value Date    WBC 14.62 (H) 02/01/2020    HGB 10.0 (L) 02/01/2020    HCT 30.3 (L) 02/01/2020     02/01/2020     Lab Results   Component Value Date    GLUCOSE 161 (H) 01/31/2020    BUN 9 03/11/2020    CREATININE 0.74 (L) 03/11/2020    EGFRIFNONA 115 03/11/2020    EGFRIFAFRI 139 03/11/2020    BCR 12.2 03/11/2020    CO2 27.7 03/11/2020    CALCIUM 9.7 03/11/2020    PROTENTOTREF 7.2 03/11/2020    ALBUMIN 4.10 03/11/2020    LABIL2 1.3 03/11/2020    AST 17 03/11/2020    ALT 22 03/11/2020     Lab Results   Component Value Date    CRP 29.09 (H) 01/29/2020     Lab Results   Component Value Date    HGBA1C 6.60 (H) 03/11/2020     HIV Ab negative     Microbiology:  1/29 BCx: negative  1/30 BAL Cx: Negative          AFB smear negative negative          Fungal smear negative, culture negative  1/31 SpCx: negative    Radiology:  CT chest reviewed with radiologist: pneumonia is resolved. There is a much smaller, thin-walled, air-filled cavity that remains; overall significant improvement from prior scan on 1/29/20    ASSESSMENT/PLAN:  1. Right upper lobe abscess and pneumonia  2. Controlled DM2  3. Long term use of antibiotics    He has now completed a 6-week course of Augmentin 500/125 mg PO q8h. His respiratory symptoms are  resolved. I reviewed the CT chest with radiology and it shows marked improvement. I recommend that he discontinue his antibiotics today. He previously cancelled his thoracic surgery appointment. I will send a message to their office to ask if he can be seen in follow-up though at this point I would be surprised if he needed any surgical intervention. He is agreeable to the appt. I asked him to call me if any return of fever or  respiratory symptoms.

## 2020-03-24 DIAGNOSIS — G47.33 OSA (OBSTRUCTIVE SLEEP APNEA): Primary | ICD-10-CM

## 2020-06-05 ENCOUNTER — RESULTS ENCOUNTER (OUTPATIENT)
Dept: INTERNAL MEDICINE | Facility: CLINIC | Age: 45
End: 2020-06-05

## 2020-06-05 DIAGNOSIS — E11.9 TYPE 2 DIABETES MELLITUS WITHOUT COMPLICATION, WITHOUT LONG-TERM CURRENT USE OF INSULIN (HCC): ICD-10-CM

## 2020-06-15 DIAGNOSIS — E11.9 TYPE 2 DIABETES MELLITUS WITHOUT COMPLICATION, WITHOUT LONG-TERM CURRENT USE OF INSULIN (HCC): ICD-10-CM

## 2020-06-15 RX ORDER — ATORVASTATIN CALCIUM 10 MG/1
TABLET, FILM COATED ORAL
Qty: 30 TABLET | Refills: 0 | Status: SHIPPED | OUTPATIENT
Start: 2020-06-15 | End: 2020-07-23 | Stop reason: SDUPTHER

## 2020-07-22 ENCOUNTER — TELEPHONE (OUTPATIENT)
Dept: INTERNAL MEDICINE | Facility: CLINIC | Age: 45
End: 2020-07-22

## 2020-07-22 NOTE — TELEPHONE ENCOUNTER
Video visit are billed the same as office visits. I am willing to refill his medications through November to give him some time to hopefully return to work. I am willing to push his office visit to the end of October as well as his labs provided that he is feeling well and is without complaints. With that said though I am trusting that he is doing all the things he needs to in order to take care of himself like exercising, losing weight and eating right.

## 2020-07-22 NOTE — TELEPHONE ENCOUNTER
PATIENT REQUESTS VIDEO/PHONE VISIT FOR HIS APPOINTMENT ON AUG 6TH. HE HAS BEEN LAID  OFF FOR TWO MONTHS BUT NEEDS TO GET HIS PRESCRIPTIONS FILLED. HE IS NOT SURE IF HE CAN EVEN COME IN FOR A VISIT DUE TO FINANCIAL REASONS.     PLEASE ADVISE  [phone]

## 2020-07-23 ENCOUNTER — TELEPHONE (OUTPATIENT)
Dept: INTERNAL MEDICINE | Facility: CLINIC | Age: 45
End: 2020-07-23

## 2020-07-23 DIAGNOSIS — K21.9 GASTROESOPHAGEAL REFLUX DISEASE WITHOUT ESOPHAGITIS: ICD-10-CM

## 2020-07-23 DIAGNOSIS — I10 ESSENTIAL HYPERTENSION: ICD-10-CM

## 2020-07-23 DIAGNOSIS — E11.9 TYPE 2 DIABETES MELLITUS WITHOUT COMPLICATION, WITHOUT LONG-TERM CURRENT USE OF INSULIN (HCC): ICD-10-CM

## 2020-07-23 RX ORDER — GLIPIZIDE 10 MG/1
10 TABLET ORAL
Qty: 60 TABLET | Refills: 3 | Status: SHIPPED | OUTPATIENT
Start: 2020-07-23 | End: 2020-10-30 | Stop reason: SDUPTHER

## 2020-07-23 RX ORDER — ATORVASTATIN CALCIUM 10 MG/1
10 TABLET, FILM COATED ORAL DAILY
Qty: 30 TABLET | Refills: 3 | Status: SHIPPED | OUTPATIENT
Start: 2020-07-23 | End: 2020-10-30 | Stop reason: SDUPTHER

## 2020-07-23 RX ORDER — LISINOPRIL 20 MG/1
20 TABLET ORAL
Qty: 30 TABLET | Refills: 3 | Status: SHIPPED | OUTPATIENT
Start: 2020-07-23 | End: 2020-10-30 | Stop reason: SDUPTHER

## 2020-07-23 RX ORDER — OMEPRAZOLE 40 MG/1
40 CAPSULE, DELAYED RELEASE ORAL DAILY
Qty: 30 CAPSULE | Refills: 3 | Status: SHIPPED | OUTPATIENT
Start: 2020-07-23 | End: 2020-10-30 | Stop reason: SDUPTHER

## 2020-07-23 NOTE — TELEPHONE ENCOUNTER
It is ok to give him enough until end of October but I will need to see him at that time. Please refill medications.

## 2020-07-23 NOTE — TELEPHONE ENCOUNTER
PATIENT WANTS TO KNOW IF OK TO CHANGE TO A TELEPHONE OR VIDEO VISIT.    HE SAID HE HAS DONE EVERYTHING DR ASKED HIM TO, AND JUST NEEDS VISIT FOR REFILLS.    PATIENT IS NOT COMFORTABLE COMING IN DUE TO COVID    PLEASE ADVISE PATIENT  [phone]

## 2020-07-23 NOTE — TELEPHONE ENCOUNTER
Patient called regarding all six medication refills, please send to [facility], please call [phone]

## 2020-09-16 ENCOUNTER — APPOINTMENT (OUTPATIENT)
Dept: CT IMAGING | Facility: HOSPITAL | Age: 45
End: 2020-09-16

## 2020-09-16 ENCOUNTER — TELEPHONE (OUTPATIENT)
Dept: SURGERY | Facility: CLINIC | Age: 45
End: 2020-09-16

## 2020-09-16 NOTE — TELEPHONE ENCOUNTER
Spoke with patient and he does not feel comfortable to come into doctors appointment. Patient states he will call back to r/s when he is ready.

## 2020-10-29 PROBLEM — J85.1 ABSCESS OF UPPER LOBE OF RIGHT LUNG WITH PNEUMONIA (HCC): Status: RESOLVED | Noted: 2020-03-13 | Resolved: 2020-10-29

## 2020-10-29 LAB
ALBUMIN SERPL-MCNC: 4.7 G/DL (ref 3.5–5.2)
ALBUMIN/CREAT UR: <7 MG/G CREAT (ref 0–29)
ALBUMIN/GLOB SERPL: 2.1 G/DL
ALP SERPL-CCNC: 66 U/L (ref 39–117)
ALT SERPL-CCNC: 25 U/L (ref 1–41)
AST SERPL-CCNC: 18 U/L (ref 1–40)
BILIRUB SERPL-MCNC: 0.4 MG/DL (ref 0–1.2)
BUN SERPL-MCNC: 14 MG/DL (ref 6–20)
BUN/CREAT SERPL: 15.4 (ref 7–25)
CALCIUM SERPL-MCNC: 9.6 MG/DL (ref 8.6–10.5)
CHLORIDE SERPL-SCNC: 103 MMOL/L (ref 98–107)
CHOLEST SERPL-MCNC: 97 MG/DL (ref 0–200)
CHOLEST/HDLC SERPL: 2.11 {RATIO}
CO2 SERPL-SCNC: 26.9 MMOL/L (ref 22–29)
CREAT SERPL-MCNC: 0.91 MG/DL (ref 0.76–1.27)
CREAT UR-MCNC: 40.3 MG/DL
GLOBULIN SER CALC-MCNC: 2.2 GM/DL
GLUCOSE SERPL-MCNC: 73 MG/DL (ref 65–99)
HBA1C MFR BLD: 7.3 % (ref 4.8–5.6)
HDLC SERPL-MCNC: 46 MG/DL (ref 40–60)
LDLC SERPL CALC-MCNC: 32 MG/DL (ref 0–100)
MICROALBUMIN UR-MCNC: <3 UG/ML
POTASSIUM SERPL-SCNC: 4 MMOL/L (ref 3.5–5.2)
PROT SERPL-MCNC: 6.9 G/DL (ref 6–8.5)
SODIUM SERPL-SCNC: 140 MMOL/L (ref 136–145)
TRIGL SERPL-MCNC: 97 MG/DL (ref 0–150)
VLDLC SERPL CALC-MCNC: 19 MG/DL (ref 5–40)

## 2020-10-29 NOTE — PROGRESS NOTES
Subjective   No chief complaint on file.      History of Present Illness   44 y.o. male presents for follow up regarding DM2, HTN, GERD and obesity. Using CPAP and feels more rested. Back at work now. Had eyes checked and all looks good. Having fl shot today. No lows. Denies CP or dyspnea. Exercising--walking and going to gym when he can. Notes he drank a lot of beer during the time he was laid off and is cutting that way back now.      Patient Active Problem List   Diagnosis   • Hypertension   • Diabetes mellitus (CMS/HCC)   • Class 1 obesity without serious comorbidity with body mass index (BMI) of 33.0 to 33.9 in adult   • Hypoglycemia   • Abscess of upper lobe of right lung with pneumonia (CMS/HCC)   • Gastroesophageal reflux disease without esophagitis   • DERIC on CPAP       No Known Allergies    Current Outpatient Medications on File Prior to Visit   Medication Sig Dispense Refill   • [DISCONTINUED] atorvastatin (LIPITOR) 10 MG tablet Take 1 tablet by mouth Daily. 30 tablet 3   • [DISCONTINUED] glipizide (GLUCOTROL) 10 MG tablet Take 1 tablet by mouth 2 (Two) Times a Day Before Meals. 60 tablet 3   • [DISCONTINUED] lisinopril (PRINIVIL,ZESTRIL) 20 MG tablet Take 1 tablet by mouth every night at bedtime. 30 tablet 3   • [DISCONTINUED] metFORMIN (Glucophage) 1000 MG tablet Take 1 tablet by mouth 2 (Two) Times a Day With Meals. 60 tablet 3   • [DISCONTINUED] omeprazole (PrilOSEC) 40 MG capsule Take 1 capsule by mouth Daily. 30 capsule 3     No current facility-administered medications on file prior to visit.        Past Medical History:   Diagnosis Date   • Abscess of upper lobe of right lung with pneumonia (CMS/HCC) 3/13/2020   • Diabetes mellitus (CMS/HCC)    • Fatigue    • GERD (gastroesophageal reflux disease)    • Headache    • Hypertension    • Knee pain    • Low back pain    • Neck pain    • Sleep apnea        Family History   Problem Relation Age of Onset   • Diabetes Maternal Grandmother        Social  "History     Socioeconomic History   • Marital status:      Spouse name: Not on file   • Number of children: Not on file   • Years of education: Not on file   • Highest education level: Not on file   Tobacco Use   • Smoking status: Never Smoker   Substance and Sexual Activity   • Alcohol use: Yes     Frequency: Never     Comment: socially    • Drug use: No   • Sexual activity: Defer       Past Surgical History:   Procedure Laterality Date   • BRONCHOSCOPY N/A 1/30/2020    Procedure: BRONCHOSCOPY WITH RIGHT UPPER LOBE BAL AND BIOPSIES;  Surgeon: Neri Aguilera MD;  Location: [facility] ENDOSCOPY;  Service: Pulmonary       The following portions of the patient's history were reviewed and updated as appropriate: problem list, allergies, current medications, past medical history and past social history.    Review of Systems   Constitutional: Negative for fatigue.   Eyes: Negative for visual disturbance.   Respiratory: Negative for shortness of breath.    Cardiovascular: Negative for chest pain.       Immunization History   Administered Date(s) Administered   • Flu Vaccine Quad PF >36MO 10/21/2017   • Hepatitis B Vaccine Adult IM 05/24/2019, 07/05/2019, 11/22/2019   • Pneumococcal Conjugate 13-Valent (PCV13) 09/28/2018   • Pneumococcal Polysaccharide (PPSV23) 09/04/2015   • Td 09/01/2004   • Tdap 08/22/2014   • flucelvax quad pfs =>4 YRS 12/02/2019       Objective   Vitals:    10/30/20 1303   Temp: 97.5 °F (36.4 °C)   Weight: 109 kg (240 lb)   Height: 182.9 cm (72\")     Body mass index is 32.55 kg/m².  Physical Exam  Vitals signs reviewed.   Constitutional:       Appearance: Normal appearance. He is well-developed. He is obese.   HENT:      Head: Normocephalic and atraumatic.   Neck:      Musculoskeletal: Neck supple.   Cardiovascular:      Rate and Rhythm: Normal rate and regular rhythm.      Heart sounds: Normal heart sounds, S1 normal and S2 normal.   Pulmonary:      Effort: Pulmonary effort is normal.      " Breath sounds: Normal breath sounds.   Musculoskeletal:      Right lower leg: No edema.      Left lower leg: No edema.   Skin:     General: Skin is warm and dry.   Neurological:      Mental Status: He is alert.   Psychiatric:         Mood and Affect: Mood normal.         Behavior: Behavior normal.         Procedures    Assessment/Plan   Diagnoses and all orders for this visit:    1. Type 2 diabetes mellitus without complication, without long-term current use of insulin (CMS/[facility]) (Primary)  Comments:  Not at goal. 3 months LSM to get A1C back at goal as it was prior to pandemic. Eye exam complete.   Orders:  -     Basic Metabolic Panel; Future  -     Hemoglobin A1c; Future  -     metFORMIN (Glucophage) 1000 MG tablet; Take 1 tablet by mouth 2 (Two) Times a Day With Meals.  Dispense: 60 tablet; Refill: 5  -     glipizide (GLUCOTROL) 10 MG tablet; Take 1 tablet by mouth 2 (Two) Times a Day Before Meals.  Dispense: 60 tablet; Refill: 5  -     atorvastatin (LIPITOR) 10 MG tablet; Take 1 tablet by mouth Daily.  Dispense: 30 tablet; Refill: 5    2. Essential hypertension  Comments:  Controlled.   Orders:  -     lisinopril (PRINIVIL,ZESTRIL) 20 MG tablet; Take 1 tablet by mouth every night at bedtime.  Dispense: 30 tablet; Refill: 5    3. Class 1 obesity without serious comorbidity with body mass index (BMI) of 33.0 to 33.9 in adult, unspecified obesity type  Comments:  Weight loss via dietary chnages and 150 minutes weekly exercise advised. Cut back on alcohol intake.     4. Need for hepatitis C screening test  -     Hepatitis C Antibody; Future    5. Gastroesophageal reflux disease without esophagitis  -     omeprazole (PrilOSEC) 40 MG capsule; Take 1 capsule by mouth Daily.  Dispense: 30 capsule; Refill: 5    Records reviewed include previous OV with myself as well as labs.     Return in about 3 months (around 1/30/2021) for Lab B4 FUP, Annual physical.

## 2020-10-30 ENCOUNTER — OFFICE VISIT (OUTPATIENT)
Dept: INTERNAL MEDICINE | Facility: CLINIC | Age: 45
End: 2020-10-30

## 2020-10-30 VITALS — WEIGHT: 240 LBS | TEMPERATURE: 97.5 F | BODY MASS INDEX: 32.51 KG/M2 | HEIGHT: 72 IN

## 2020-10-30 DIAGNOSIS — I10 ESSENTIAL HYPERTENSION: ICD-10-CM

## 2020-10-30 DIAGNOSIS — K21.9 GASTROESOPHAGEAL REFLUX DISEASE WITHOUT ESOPHAGITIS: ICD-10-CM

## 2020-10-30 DIAGNOSIS — E66.9 CLASS 1 OBESITY WITHOUT SERIOUS COMORBIDITY WITH BODY MASS INDEX (BMI) OF 33.0 TO 33.9 IN ADULT, UNSPECIFIED OBESITY TYPE: ICD-10-CM

## 2020-10-30 DIAGNOSIS — E11.9 TYPE 2 DIABETES MELLITUS WITHOUT COMPLICATION, WITHOUT LONG-TERM CURRENT USE OF INSULIN (HCC): Primary | ICD-10-CM

## 2020-10-30 DIAGNOSIS — Z23 NEED FOR INFLUENZA VACCINATION: Primary | ICD-10-CM

## 2020-10-30 DIAGNOSIS — Z11.59 NEED FOR HEPATITIS C SCREENING TEST: ICD-10-CM

## 2020-10-30 PROBLEM — Z99.89 OSA ON CPAP: Status: ACTIVE | Noted: 2020-10-30

## 2020-10-30 PROBLEM — G47.33 OSA ON CPAP: Status: ACTIVE | Noted: 2020-10-30

## 2020-10-30 PROCEDURE — 90471 IMMUNIZATION ADMIN: CPT | Performed by: NURSE PRACTITIONER

## 2020-10-30 PROCEDURE — 99214 OFFICE O/P EST MOD 30 MIN: CPT | Performed by: NURSE PRACTITIONER

## 2020-10-30 PROCEDURE — 90686 IIV4 VACC NO PRSV 0.5 ML IM: CPT | Performed by: NURSE PRACTITIONER

## 2020-10-30 RX ORDER — LISINOPRIL 20 MG/1
20 TABLET ORAL
Qty: 30 TABLET | Refills: 5 | Status: SHIPPED | OUTPATIENT
Start: 2020-10-30 | End: 2021-01-29 | Stop reason: SDUPTHER

## 2020-10-30 RX ORDER — OMEPRAZOLE 40 MG/1
40 CAPSULE, DELAYED RELEASE ORAL DAILY
Qty: 30 CAPSULE | Refills: 5 | Status: SHIPPED | OUTPATIENT
Start: 2020-10-30 | End: 2021-01-29 | Stop reason: SDUPTHER

## 2020-10-30 RX ORDER — ATORVASTATIN CALCIUM 10 MG/1
10 TABLET, FILM COATED ORAL DAILY
Qty: 30 TABLET | Refills: 5 | Status: SHIPPED | OUTPATIENT
Start: 2020-10-30 | End: 2021-01-29 | Stop reason: SDUPTHER

## 2020-10-30 RX ORDER — GLIPIZIDE 10 MG/1
10 TABLET ORAL
Qty: 60 TABLET | Refills: 5 | Status: SHIPPED | OUTPATIENT
Start: 2020-10-30 | End: 2021-01-29 | Stop reason: SDUPTHER

## 2021-01-10 DIAGNOSIS — E11.9 TYPE 2 DIABETES MELLITUS WITHOUT COMPLICATION, WITHOUT LONG-TERM CURRENT USE OF INSULIN (HCC): ICD-10-CM

## 2021-01-11 RX ORDER — EMPAGLIFLOZIN 10 MG/1
TABLET, FILM COATED ORAL
Qty: 30 TABLET | Refills: 0 | Status: SHIPPED | OUTPATIENT
Start: 2021-01-11 | End: 2021-01-12 | Stop reason: SDUPTHER

## 2021-01-12 DIAGNOSIS — E11.9 TYPE 2 DIABETES MELLITUS WITHOUT COMPLICATION, WITHOUT LONG-TERM CURRENT USE OF INSULIN (HCC): ICD-10-CM

## 2021-01-12 RX ORDER — EMPAGLIFLOZIN 10 MG/1
1 TABLET, FILM COATED ORAL DAILY
Qty: 30 TABLET | Refills: 0 | Status: SHIPPED | OUTPATIENT
Start: 2021-01-12 | End: 2021-01-21 | Stop reason: SDUPTHER

## 2021-01-21 ENCOUNTER — TELEPHONE (OUTPATIENT)
Dept: INTERNAL MEDICINE | Facility: CLINIC | Age: 46
End: 2021-01-21

## 2021-01-21 DIAGNOSIS — E11.9 TYPE 2 DIABETES MELLITUS WITHOUT COMPLICATION, WITHOUT LONG-TERM CURRENT USE OF INSULIN (HCC): Primary | ICD-10-CM

## 2021-01-21 DIAGNOSIS — E11.9 TYPE 2 DIABETES MELLITUS WITHOUT COMPLICATION, WITHOUT LONG-TERM CURRENT USE OF INSULIN (HCC): ICD-10-CM

## 2021-01-21 RX ORDER — DAPAGLIFLOZIN 10 MG/1
10 TABLET, FILM COATED ORAL DAILY
Qty: 30 TABLET | Refills: 0 | Status: SHIPPED | OUTPATIENT
Start: 2021-01-21 | End: 2021-02-19

## 2021-01-21 RX ORDER — EMPAGLIFLOZIN 10 MG/1
1 TABLET, FILM COATED ORAL DAILY
Qty: 30 TABLET | Refills: 0 | Status: SHIPPED | OUTPATIENT
Start: 2021-01-21 | End: 2021-01-21

## 2021-01-21 NOTE — TELEPHONE ENCOUNTER
Pharmacy called and patient cannot pay 500 dollars for his Jardiance- he is wanting a medicine sent in that comes in a generic.

## 2021-01-25 ENCOUNTER — TELEPHONE (OUTPATIENT)
Dept: INTERNAL MEDICINE | Facility: CLINIC | Age: 46
End: 2021-01-25

## 2021-01-27 PROBLEM — J85.1 ABSCESS OF UPPER LOBE OF RIGHT LUNG WITH PNEUMONIA (HCC): Status: RESOLVED | Noted: 2020-03-13 | Resolved: 2021-01-27

## 2021-01-27 NOTE — PROGRESS NOTES
Subjective   Chief Complaint   Patient presents with   • Annual Exam   • Diabetes       History of Present Illness   45 y.o. male presents for annual physical.     Negative FH CRC. Never had a colonoscopy. Denies hematochezia or melena.     Fell off the wagon from a dietary perspective--was quarantined at the end of the year, couldn't see his family and drank a lot over the course of 4 weeks. Last eye exam with [facility]. Last eye check in August. Never started Farxiga due to cost. Jardiance was not covered starting in January.     Dad had PRCA later in life before he  at age 69.      Patient Active Problem List   Diagnosis   • Hypertension   • Diabetes mellitus (CMS/HCC)   • Class 1 obesity without serious comorbidity with body mass index (BMI) of 33.0 to 33.9 in adult   • Hypoglycemia   • Gastroesophageal reflux disease without esophagitis   • DERIC on CPAP       No Known Allergies    Current Outpatient Medications on File Prior to Visit   Medication Sig Dispense Refill   • [DISCONTINUED] atorvastatin (LIPITOR) 10 MG tablet Take 1 tablet by mouth Daily. 30 tablet 5   • [DISCONTINUED] glipizide (GLUCOTROL) 10 MG tablet Take 1 tablet by mouth 2 (Two) Times a Day Before Meals. 60 tablet 5   • [DISCONTINUED] lisinopril (PRINIVIL,ZESTRIL) 20 MG tablet Take 1 tablet by mouth every night at bedtime. 30 tablet 5   • [DISCONTINUED] metFORMIN (Glucophage) 1000 MG tablet Take 1 tablet by mouth 2 (Two) Times a Day With Meals. 60 tablet 5   • [DISCONTINUED] omeprazole (PrilOSEC) 40 MG capsule Take 1 capsule by mouth Daily. 30 capsule 5   • Dapagliflozin Propanediol (Farxiga) 10 MG tablet Take 10 mg by mouth Daily. 30 tablet 0     No current facility-administered medications on file prior to visit.        Past Medical History:   Diagnosis Date   • Abscess of upper lobe of right lung with pneumonia (CMS/HCC) 3/13/2020   • Diabetes mellitus (CMS/HCC)    • Headache    • Hypertension    • Knee pain    • Low back pain    • Neck  pain        Family History   Problem Relation Age of Onset   • Diabetes Maternal Grandmother        Social History     Socioeconomic History   • Marital status:      Spouse name: Not on file   • Number of children: Not on file   • Years of education: Not on file   • Highest education level: Not on file   Tobacco Use   • Smoking status: Never Smoker   Substance and Sexual Activity   • Alcohol use: Yes     Frequency: Never     Comment: socially    • Drug use: No   • Sexual activity: Defer       Past Surgical History:   Procedure Laterality Date   • BRONCHOSCOPY N/A 1/30/2020    Procedure: BRONCHOSCOPY WITH RIGHT UPPER LOBE BAL AND BIOPSIES;  Surgeon: Neri Aguilera MD;  Location: [facility] ENDOSCOPY;  Service: Pulmonary       The following portions of the patient's history were reviewed and updated as appropriate: problem list, allergies, current medications, past medical history, past family history, past social history and past surgical history.    Review of Systems   Constitutional: Negative for fatigue.   HENT: Negative for congestion.    Eyes: Negative for visual disturbance.   Respiratory: Negative for shortness of breath.    Cardiovascular: Negative for chest pain.   Gastrointestinal: Negative for blood in stool.   Endocrine: Negative.    Genitourinary: Negative.    Musculoskeletal: Negative for arthralgias.   Skin: Negative for rash.   Allergic/Immunologic: Negative for environmental allergies.   Neurological: Negative for headache.   Hematological: Does not bruise/bleed easily.   Psychiatric/Behavioral: The patient is not nervous/anxious.        Immunization History   Administered Date(s) Administered   • Flu Vaccine Quad PF >36MO 10/21/2017   • Flulaval/Fluarix/Fluzone Quad 10/30/2020   • Hepatitis B Vaccine Adult IM 05/24/2019, 07/05/2019, 11/22/2019   • Pneumococcal Conjugate 13-Valent (PCV13) 09/28/2018   • Pneumococcal Polysaccharide (PPSV23) 09/04/2015   • Td 09/01/2004   • Tdap 08/22/2014   •  "flucelvax quad pfs =>4 YRS 12/02/2019       Objective   Vitals:    01/29/21 0916   BP: 116/72   Pulse: 88   Resp: 12   Temp: 97.5 °F (36.4 °C)   Weight: 111 kg (245 lb)   Height: 182.9 cm (72\")     Body mass index is 33.23 kg/m².  Physical Exam  Vitals signs reviewed.   Constitutional:       Appearance: Normal appearance. He is well-developed.   HENT:      Head: Normocephalic and atraumatic.      Right Ear: Tympanic membrane, ear canal and external ear normal.      Left Ear: Tympanic membrane, ear canal and external ear normal.      Nose: Nose normal.      Mouth/Throat:      Mouth: Mucous membranes are moist.      Pharynx: Oropharynx is clear. No oropharyngeal exudate or posterior oropharyngeal erythema.   Eyes:      General: No scleral icterus.     Extraocular Movements: Extraocular movements intact.      Conjunctiva/sclera: Conjunctivae normal.      Pupils: Pupils are equal, round, and reactive to light.   Neck:      Musculoskeletal: Neck supple.      Thyroid: No thyromegaly.      Trachea: Trachea normal.   Cardiovascular:      Rate and Rhythm: Normal rate and regular rhythm.      Heart sounds: Normal heart sounds. No murmur.   Pulmonary:      Effort: Pulmonary effort is normal.      Breath sounds: Normal breath sounds.   Abdominal:      General: Bowel sounds are normal. There is no distension.      Palpations: Abdomen is soft.      Tenderness: There is no abdominal tenderness.   Genitourinary:     Prostate: Normal.      Rectum: Normal.   Musculoskeletal:      Comments: Ambulates without assistance; no clubbing, cyanosis or edema.    Lymphadenopathy:      Cervical: No cervical adenopathy.   Skin:     General: Skin is warm and dry.   Neurological:      Mental Status: He is alert.   Psychiatric:         Mood and Affect: Mood normal.         Behavior: Behavior normal.         Procedures    Assessment/Plan   Diagnoses and all orders for this visit:    1. Annual physical exam (Primary)  Comments:  Weight loss via " dietary changes and 150 minutes exercise advised. Immunizations UTD as is eye exam.   Orders:  -     Comprehensive Metabolic Panel; Future    2. Essential hypertension  Comments:  Controlled.   Orders:  -     Comprehensive Metabolic Panel; Future  -     lisinopril (PRINIVIL,ZESTRIL) 20 MG tablet; Take 1 tablet by mouth every night at bedtime.  Dispense: 30 tablet; Refill: 5    3. Type 2 diabetes mellitus without complication, without long-term current use of insulin (CMS/[facility])  Comments:  Not at goal. A1C 8.2%. Out of Jardiance for one month. Steglatro 5 mg samples provided. Contating Jardiance rep for assistance regarding coverage.   Orders:  -     Comprehensive Metabolic Panel; Future  -     Hemoglobin A1c; Future  -     metFORMIN (Glucophage) 1000 MG tablet; Take 1 tablet by mouth 2 (Two) Times a Day With Meals.  Dispense: 60 tablet; Refill: 5  -     glipizide (GLUCOTROL) 10 MG tablet; Take 1 tablet by mouth 2 (Two) Times a Day Before Meals.  Dispense: 60 tablet; Refill: 5  -     atorvastatin (LIPITOR) 10 MG tablet; Take 1 tablet by mouth Daily.  Dispense: 30 tablet; Refill: 5    4. Need for hepatitis C screening test    5. Prostate cancer screening  -     PSA Screen; Future    6. Colon cancer screening  Comments:  Schedule with Dr. Bloton.   Orders:  -     Ambulatory Referral For Screening Colonoscopy; Future    7. Gastroesophageal reflux disease without esophagitis  Comments:  Controlled.   Orders:  -     omeprazole (PrilOSEC) 40 MG capsule; Take 1 capsule by mouth Daily.  Dispense: 30 capsule; Refill: 5    Records reviewed include previous OV with myself as well as labs.     Return in about 3 months (around 4/29/2021) for Lab B4 FUP.

## 2021-01-29 ENCOUNTER — OFFICE VISIT (OUTPATIENT)
Dept: INTERNAL MEDICINE | Facility: CLINIC | Age: 46
End: 2021-01-29

## 2021-01-29 VITALS
WEIGHT: 245 LBS | SYSTOLIC BLOOD PRESSURE: 116 MMHG | TEMPERATURE: 97.5 F | DIASTOLIC BLOOD PRESSURE: 72 MMHG | HEART RATE: 88 BPM | RESPIRATION RATE: 12 BRPM | HEIGHT: 72 IN | BODY MASS INDEX: 33.18 KG/M2

## 2021-01-29 DIAGNOSIS — I10 ESSENTIAL HYPERTENSION: Chronic | ICD-10-CM

## 2021-01-29 DIAGNOSIS — Z12.11 COLON CANCER SCREENING: ICD-10-CM

## 2021-01-29 DIAGNOSIS — Z12.5 PROSTATE CANCER SCREENING: ICD-10-CM

## 2021-01-29 DIAGNOSIS — Z11.59 NEED FOR HEPATITIS C SCREENING TEST: ICD-10-CM

## 2021-01-29 DIAGNOSIS — K21.9 GASTROESOPHAGEAL REFLUX DISEASE WITHOUT ESOPHAGITIS: Chronic | ICD-10-CM

## 2021-01-29 DIAGNOSIS — Z00.00 ANNUAL PHYSICAL EXAM: Primary | ICD-10-CM

## 2021-01-29 DIAGNOSIS — E11.9 TYPE 2 DIABETES MELLITUS WITHOUT COMPLICATION, WITHOUT LONG-TERM CURRENT USE OF INSULIN (HCC): Chronic | ICD-10-CM

## 2021-01-29 PROCEDURE — 99213 OFFICE O/P EST LOW 20 MIN: CPT | Performed by: NURSE PRACTITIONER

## 2021-01-29 PROCEDURE — 99396 PREV VISIT EST AGE 40-64: CPT | Performed by: NURSE PRACTITIONER

## 2021-01-29 RX ORDER — ATORVASTATIN CALCIUM 10 MG/1
10 TABLET, FILM COATED ORAL DAILY
Qty: 30 TABLET | Refills: 5 | Status: SHIPPED | OUTPATIENT
Start: 2021-01-29 | End: 2021-04-30 | Stop reason: SDUPTHER

## 2021-01-29 RX ORDER — OMEPRAZOLE 40 MG/1
40 CAPSULE, DELAYED RELEASE ORAL DAILY
Qty: 30 CAPSULE | Refills: 5 | Status: SHIPPED | OUTPATIENT
Start: 2021-01-29 | End: 2021-04-30 | Stop reason: SDUPTHER

## 2021-01-29 RX ORDER — LISINOPRIL 20 MG/1
20 TABLET ORAL
Qty: 30 TABLET | Refills: 5 | Status: SHIPPED | OUTPATIENT
Start: 2021-01-29 | End: 2021-04-30 | Stop reason: SDUPTHER

## 2021-01-29 RX ORDER — GLIPIZIDE 10 MG/1
10 TABLET ORAL
Qty: 60 TABLET | Refills: 5 | Status: SHIPPED | OUTPATIENT
Start: 2021-01-29 | End: 2021-04-30 | Stop reason: SDUPTHER

## 2021-01-29 NOTE — PATIENT INSTRUCTIONS
If you have not heard from us in 2 weeks about Jardiance please call me so we can be sure to get this straightened out.     Weight loss via dietary changes and 150 minutes weekly aerobic activity advised.

## 2021-02-18 ENCOUNTER — TELEPHONE (OUTPATIENT)
Dept: GASTROENTEROLOGY | Facility: CLINIC | Age: 46
End: 2021-02-18

## 2021-02-19 ENCOUNTER — PREP FOR SURGERY (OUTPATIENT)
Dept: SURGERY | Facility: SURGERY CENTER | Age: 46
End: 2021-02-19

## 2021-02-19 ENCOUNTER — TELEPHONE (OUTPATIENT)
Dept: INTERNAL MEDICINE | Facility: CLINIC | Age: 46
End: 2021-02-19

## 2021-02-19 DIAGNOSIS — Z12.11 ENCOUNTER FOR SCREENING FOR MALIGNANT NEOPLASM OF COLON: Primary | ICD-10-CM

## 2021-02-19 DIAGNOSIS — E11.9 TYPE 2 DIABETES MELLITUS WITHOUT COMPLICATION, WITHOUT LONG-TERM CURRENT USE OF INSULIN (HCC): Primary | ICD-10-CM

## 2021-02-19 RX ORDER — SODIUM CHLORIDE, SODIUM LACTATE, POTASSIUM CHLORIDE, CALCIUM CHLORIDE 600; 310; 30; 20 MG/100ML; MG/100ML; MG/100ML; MG/100ML
30 INJECTION, SOLUTION INTRAVENOUS CONTINUOUS PRN
Status: CANCELLED | OUTPATIENT
Start: 2021-02-19

## 2021-02-19 RX ORDER — SODIUM CHLORIDE 0.9 % (FLUSH) 0.9 %
3 SYRINGE (ML) INJECTION EVERY 12 HOURS SCHEDULED
Status: CANCELLED | OUTPATIENT
Start: 2021-02-19

## 2021-02-19 RX ORDER — SODIUM CHLORIDE 0.9 % (FLUSH) 0.9 %
10 SYRINGE (ML) INJECTION AS NEEDED
Status: CANCELLED | OUTPATIENT
Start: 2021-02-19

## 2021-02-19 NOTE — TELEPHONE ENCOUNTER
Do you want to send in a prescription for the samples that were given. Insurance denied Farxiga and Jardiance.

## 2021-02-19 NOTE — TELEPHONE ENCOUNTER
PATIENT CALLED WANTING TO SPEAK WITH CLINICAL REGARDING A MEDICATION SAMPLE THAT HE WAS GIVEN BY DEBBY KEVIN. HE IS ALMOST OUT OF THE SAMPLE AND IS WONDERING IF SHE IS GOING TO SEND A REFILL TO THE PHARMACY.    PLEASE ADVISE  [phone]

## 2021-02-19 NOTE — TELEPHONE ENCOUNTER
Order sent--see if we have any more samples so we can help him if it is not covered. He will need to call his insurance though and find out what is covered after this as they have denied appeals for Jardiance and Farxiga.

## 2021-03-02 ENCOUNTER — TELEPHONE (OUTPATIENT)
Dept: INTERNAL MEDICINE | Facility: CLINIC | Age: 46
End: 2021-03-02

## 2021-03-02 NOTE — TELEPHONE ENCOUNTER
Daughter is out of quarantine as of yesterday, I told him as long as she is out of quarantine with no symptoms he is fine.

## 2021-03-02 NOTE — TELEPHONE ENCOUNTER
PT IS CALLING IN STATING THAT HIS DAUGHTER DOES NOT LIVE WITH HIM BUT SHE IS JUST GETTING OVER COVID.  PT IS A DIABETIC AND WANTS TO KNOW IF IT IS OK FOR HIM TO BE AROUND HER.  PT WANTS TO BE CALLED BACK TO DISCUSS THIS.    PT CALL BACK  [phone]

## 2021-03-02 NOTE — TELEPHONE ENCOUNTER
Wanted to make sure again he could go around his daughter again. And he asked if daughter needed to be retested I told him 4 different times no

## 2021-03-02 NOTE — TELEPHONE ENCOUNTER
PATIENT REQUESTING TO SPEAK WITH SHARAN. PATIENT DID NOT WANT TO DISCUSS WITH ME WHAT THE CALL WAS FOR. PLEASE ADVISE.     CALLBACK NUMBER -[phone]

## 2021-04-26 NOTE — PROGRESS NOTES
Subjective   Chief Complaint   Patient presents with   • Diabetes   • Hypertension   • Obesity       History of Present Illness   45 y.o. male presents for follow up regarding DM2, HTN and obesity. He has lost 14#  With dietary changes and walking. Denies chest pain or dyspnea. CLS next week. Reflux controlled with omeprazole.      Patient Active Problem List   Diagnosis   • Hypertension   • Diabetes mellitus (CMS/HCC)   • Class 1 obesity without serious comorbidity with body mass index (BMI) of 33.0 to 33.9 in adult   • Hypoglycemia   • Gastroesophageal reflux disease without esophagitis   • DERIC on CPAP       No Known Allergies    Current Outpatient Medications on File Prior to Visit   Medication Sig Dispense Refill   • [DISCONTINUED] atorvastatin (LIPITOR) 10 MG tablet Take 1 tablet by mouth Daily. 30 tablet 5   • [DISCONTINUED] Ertugliflozin L-PyroglutamicAc (STEGLATRO) 5 MG tablet Take 1 tablet by mouth Every Morning. 30 tablet 2   • [DISCONTINUED] glipizide (GLUCOTROL) 10 MG tablet Take 1 tablet by mouth 2 (Two) Times a Day Before Meals. 60 tablet 5   • [DISCONTINUED] lisinopril (PRINIVIL,ZESTRIL) 20 MG tablet Take 1 tablet by mouth every night at bedtime. 30 tablet 5   • [DISCONTINUED] metFORMIN (Glucophage) 1000 MG tablet Take 1 tablet by mouth 2 (Two) Times a Day With Meals. 60 tablet 5   • [DISCONTINUED] omeprazole (PrilOSEC) 40 MG capsule Take 1 capsule by mouth Daily. 30 capsule 5     No current facility-administered medications on file prior to visit.       Past Medical History:   Diagnosis Date   • Abscess of upper lobe of right lung with pneumonia (CMS/HCC) 3/13/2020   • Diabetes mellitus (CMS/HCC)    • Headache    • Hypertension    • Knee pain    • Low back pain    • Neck pain        Family History   Problem Relation Age of Onset   • Diabetes Maternal Grandmother        Social History     Socioeconomic History   • Marital status:      Spouse name: Not on file   • Number of children: Not on  "file   • Years of education: Not on file   • Highest education level: Not on file   Tobacco Use   • Smoking status: Never Smoker   • Smokeless tobacco: Never Used   Substance and Sexual Activity   • Alcohol use: Yes     Comment: socially    • Drug use: No   • Sexual activity: Defer       Past Surgical History:   Procedure Laterality Date   • BRONCHOSCOPY N/A 1/30/2020    Procedure: BRONCHOSCOPY WITH RIGHT UPPER LOBE BAL AND BIOPSIES;  Surgeon: Neri Aguilera MD;  Location: [facility] ENDOSCOPY;  Service: Pulmonary       The following portions of the patient's history were reviewed and updated as appropriate: problem list, allergies, current medications, past medical history and past social history.    Review of Systems    Immunization History   Administered Date(s) Administered   • Flu Vaccine Quad PF >36MO 09/20/2017, 10/21/2017   • Flu Vaccine Split Quad 10/26/2018   • Flulaval/Fluarix/Fluzone Quad 10/30/2020   • Hepatitis B 05/24/2019, 07/05/2019, 11/22/2019   • Hepatitis B Vaccine Adult IM 05/24/2019, 07/05/2019, 11/22/2019   • Influenza, Unspecified 12/02/2019   • Pneumococcal Conjugate 13-Valent (PCV13) 09/28/2018   • Pneumococcal Polysaccharide (PPSV23) 09/04/2015   • Td 09/01/2004   • Tdap 08/22/2014   • flucelvax quad pfs =>4 YRS 12/02/2019       Objective   Vitals:    04/30/21 1248   BP: 110/78   Pulse: 80   Resp: 16   Temp: 97.8 °F (36.6 °C)   Weight: 105 kg (231 lb)   Height: 182.9 cm (72\")     Body mass index is 31.33 kg/m².  Physical Exam  Vitals reviewed.   Constitutional:       Appearance: Normal appearance. He is well-developed.   HENT:      Head: Normocephalic and atraumatic.   Cardiovascular:      Rate and Rhythm: Normal rate and regular rhythm.      Heart sounds: Normal heart sounds, S1 normal and S2 normal.   Pulmonary:      Effort: Pulmonary effort is normal.      Breath sounds: Normal breath sounds.   Musculoskeletal:      Cervical back: Neck supple.      Comments: Ambulates without assistance; " no clubbing, cyanosis or edema.       Skin:     General: Skin is warm and dry.   Neurological:      Mental Status: He is alert.   Psychiatric:         Mood and Affect: Mood normal.         Behavior: Behavior normal.         Procedures    Assessment/Plan   Diagnoses and all orders for this visit:    1. Type 2 diabetes mellitus without complication, without long-term current use of insulin (CMS/[facility]) (Primary)  Comments:  Controlled. A1C 6.6%. Continue LSA, Metformin, Stelatro and Glipizide as below.   Orders:  -     atorvastatin (LIPITOR) 10 MG tablet; Take 1 tablet by mouth Daily.  Dispense: 90 tablet; Refill: 3  -     Ertugliflozin L-PyroglutamicAc (STEGLATRO) 5 MG tablet; Take 1 tablet by mouth Every Morning.  Dispense: 90 tablet; Refill: 1  -     glipizide (GLUCOTROL) 10 MG tablet; Take 1 tablet by mouth 2 (Two) Times a Day Before Meals.  Dispense: 180 tablet; Refill: 1  -     metFORMIN (Glucophage) 1000 MG tablet; Take 1 tablet by mouth 2 (Two) Times a Day With Meals.  Dispense: 180 tablet; Refill: 1  -     Hemoglobin A1c; Future  -     Basic Metabolic Panel; Future    2. Essential hypertension  Comments:  Controlled. Refill Lisinopril. Continue weight loss.   Orders:  -     lisinopril (PRINIVIL,ZESTRIL) 20 MG tablet; Take 1 tablet by mouth every night at bedtime.  Dispense: 90 tablet; Refill: 1  -     Basic Metabolic Panel; Future    3. Gastroesophageal reflux disease without esophagitis  Comments:  Controlled. Refill Omeprazole as below.   Orders:  -     omeprazole (priLOSEC) 40 MG capsule; Take 1 capsule by mouth Daily.  Dispense: 90 capsule; Refill: 3    4. Obesity (BMI 30.0-34.9)  Comments:  Encouraged continued weight loss with dietary changes and 150 minutes weekly activity.     5. Healthcare maintenance  Comments:  CLS next week with Dr. Bolton. KLAUDIA vaccination advised--he is not yet ready.     Records reviewed include previous OV with myself as well as labs.     Return in about 3 months (around  7/30/2021) for Lab B4 [facility].

## 2021-04-30 ENCOUNTER — OFFICE VISIT (OUTPATIENT)
Dept: INTERNAL MEDICINE | Facility: CLINIC | Age: 46
End: 2021-04-30

## 2021-04-30 VITALS
RESPIRATION RATE: 16 BRPM | DIASTOLIC BLOOD PRESSURE: 78 MMHG | SYSTOLIC BLOOD PRESSURE: 110 MMHG | TEMPERATURE: 97.8 F | WEIGHT: 231 LBS | HEART RATE: 80 BPM | BODY MASS INDEX: 31.29 KG/M2 | HEIGHT: 72 IN

## 2021-04-30 DIAGNOSIS — E11.9 TYPE 2 DIABETES MELLITUS WITHOUT COMPLICATION, WITHOUT LONG-TERM CURRENT USE OF INSULIN (HCC): Primary | Chronic | ICD-10-CM

## 2021-04-30 DIAGNOSIS — Z00.00 HEALTHCARE MAINTENANCE: ICD-10-CM

## 2021-04-30 DIAGNOSIS — E66.9 OBESITY (BMI 30.0-34.9): ICD-10-CM

## 2021-04-30 DIAGNOSIS — K21.9 GASTROESOPHAGEAL REFLUX DISEASE WITHOUT ESOPHAGITIS: Chronic | ICD-10-CM

## 2021-04-30 DIAGNOSIS — I10 ESSENTIAL HYPERTENSION: Chronic | ICD-10-CM

## 2021-04-30 PROCEDURE — 99214 OFFICE O/P EST MOD 30 MIN: CPT | Performed by: NURSE PRACTITIONER

## 2021-04-30 RX ORDER — LISINOPRIL 20 MG/1
20 TABLET ORAL
Qty: 90 TABLET | Refills: 1 | Status: SHIPPED | OUTPATIENT
Start: 2021-04-30 | End: 2021-07-30 | Stop reason: SDUPTHER

## 2021-04-30 RX ORDER — OMEPRAZOLE 40 MG/1
40 CAPSULE, DELAYED RELEASE ORAL DAILY
Qty: 90 CAPSULE | Refills: 3 | Status: SHIPPED | OUTPATIENT
Start: 2021-04-30 | End: 2021-07-30 | Stop reason: SDUPTHER

## 2021-04-30 RX ORDER — ATORVASTATIN CALCIUM 10 MG/1
10 TABLET, FILM COATED ORAL DAILY
Qty: 90 TABLET | Refills: 3 | Status: SHIPPED | OUTPATIENT
Start: 2021-04-30 | End: 2021-07-30 | Stop reason: SDUPTHER

## 2021-04-30 RX ORDER — GLIPIZIDE 10 MG/1
10 TABLET ORAL
Qty: 180 TABLET | Refills: 1 | Status: SHIPPED | OUTPATIENT
Start: 2021-04-30 | End: 2021-07-30 | Stop reason: SDUPTHER

## 2021-05-03 ENCOUNTER — LAB REQUISITION (OUTPATIENT)
Dept: LAB | Facility: HOSPITAL | Age: 46
End: 2021-05-03

## 2021-05-03 DIAGNOSIS — Z00.00 ENCOUNTER FOR GENERAL ADULT MEDICAL EXAMINATION WITHOUT ABNORMAL FINDINGS: ICD-10-CM

## 2021-05-03 PROCEDURE — U0004 COV-19 TEST NON-CDC HGH THRU: HCPCS | Performed by: INTERNAL MEDICINE

## 2021-05-04 LAB — SARS-COV-2 ORF1AB RESP QL NAA+PROBE: NOT DETECTED

## 2021-05-07 ENCOUNTER — OUTSIDE FACILITY SERVICE (OUTPATIENT)
Dept: GASTROENTEROLOGY | Facility: CLINIC | Age: 46
End: 2021-05-07

## 2021-05-07 PROCEDURE — 45380 COLONOSCOPY AND BIOPSY: CPT | Performed by: INTERNAL MEDICINE

## 2021-05-07 PROCEDURE — 45385 COLONOSCOPY W/LESION REMOVAL: CPT | Performed by: INTERNAL MEDICINE

## 2021-07-30 ENCOUNTER — OFFICE VISIT (OUTPATIENT)
Dept: INTERNAL MEDICINE | Facility: CLINIC | Age: 46
End: 2021-07-30

## 2021-07-30 VITALS
TEMPERATURE: 97.5 F | DIASTOLIC BLOOD PRESSURE: 74 MMHG | BODY MASS INDEX: 30.88 KG/M2 | HEART RATE: 84 BPM | RESPIRATION RATE: 14 BRPM | SYSTOLIC BLOOD PRESSURE: 108 MMHG | HEIGHT: 72 IN | WEIGHT: 228 LBS

## 2021-07-30 DIAGNOSIS — E11.9 TYPE 2 DIABETES MELLITUS WITHOUT COMPLICATION, WITHOUT LONG-TERM CURRENT USE OF INSULIN (HCC): Primary | Chronic | ICD-10-CM

## 2021-07-30 DIAGNOSIS — E66.9 CLASS 1 OBESITY WITHOUT SERIOUS COMORBIDITY WITH BODY MASS INDEX (BMI) OF 33.0 TO 33.9 IN ADULT, UNSPECIFIED OBESITY TYPE: Chronic | ICD-10-CM

## 2021-07-30 DIAGNOSIS — I10 ESSENTIAL HYPERTENSION: Chronic | ICD-10-CM

## 2021-07-30 DIAGNOSIS — Z00.00 HEALTHCARE MAINTENANCE: ICD-10-CM

## 2021-07-30 DIAGNOSIS — K21.9 GASTROESOPHAGEAL REFLUX DISEASE WITHOUT ESOPHAGITIS: Chronic | ICD-10-CM

## 2021-07-30 PROCEDURE — 99214 OFFICE O/P EST MOD 30 MIN: CPT | Performed by: NURSE PRACTITIONER

## 2021-07-30 RX ORDER — OMEPRAZOLE 40 MG/1
40 CAPSULE, DELAYED RELEASE ORAL DAILY
Qty: 90 CAPSULE | Refills: 3 | Status: SHIPPED | OUTPATIENT
Start: 2021-07-30 | End: 2021-11-18 | Stop reason: SDUPTHER

## 2021-07-30 RX ORDER — ATORVASTATIN CALCIUM 10 MG/1
10 TABLET, FILM COATED ORAL DAILY
Qty: 90 TABLET | Refills: 3 | Status: SHIPPED | OUTPATIENT
Start: 2021-07-30 | End: 2021-11-18 | Stop reason: SDUPTHER

## 2021-07-30 RX ORDER — LISINOPRIL 20 MG/1
20 TABLET ORAL
Qty: 90 TABLET | Refills: 1 | Status: SHIPPED | OUTPATIENT
Start: 2021-07-30 | End: 2021-11-18 | Stop reason: SDUPTHER

## 2021-07-30 RX ORDER — GLIPIZIDE 10 MG/1
10 TABLET ORAL
Qty: 180 TABLET | Refills: 1 | Status: SHIPPED | OUTPATIENT
Start: 2021-07-30 | End: 2021-11-18 | Stop reason: SDUPTHER

## 2021-07-30 NOTE — PROGRESS NOTES
Subjective   Chief Complaint   Patient presents with   • Diabetes   • Hypertension       History of Present Illness   45 y.o. male presents for follow up regarding DM2, obesity, HTN and GERD. He is feeling good without complaints. Eye exam due soon and he will schedule at [facility]. No low blood sugars. No chest pain or dyspnea. No COVID vaccination yet--still considering. Regular use of CPAP.      Patient Active Problem List   Diagnosis   • Hypertension   • Diabetes mellitus (CMS/HCC)   • Class 1 obesity without serious comorbidity with body mass index (BMI) of 33.0 to 33.9 in adult   • Hypoglycemia   • Gastroesophageal reflux disease without esophagitis   • DERIC on CPAP       No Known Allergies    Current Outpatient Medications on File Prior to Visit   Medication Sig Dispense Refill   • [DISCONTINUED] atorvastatin (LIPITOR) 10 MG tablet Take 1 tablet by mouth Daily. 90 tablet 3   • [DISCONTINUED] Ertugliflozin L-PyroglutamicAc (STEGLATRO) 5 MG tablet Take 1 tablet by mouth Every Morning. 90 tablet 1   • [DISCONTINUED] glipizide (GLUCOTROL) 10 MG tablet Take 1 tablet by mouth 2 (Two) Times a Day Before Meals. 180 tablet 1   • [DISCONTINUED] lisinopril (PRINIVIL,ZESTRIL) 20 MG tablet Take 1 tablet by mouth every night at bedtime. 90 tablet 1   • [DISCONTINUED] metFORMIN (Glucophage) 1000 MG tablet Take 1 tablet by mouth 2 (Two) Times a Day With Meals. 180 tablet 1   • [DISCONTINUED] omeprazole (priLOSEC) 40 MG capsule Take 1 capsule by mouth Daily. 90 capsule 3     No current facility-administered medications on file prior to visit.       Past Medical History:   Diagnosis Date   • Abscess of upper lobe of right lung with pneumonia (CMS/HCC) 3/13/2020   • Diabetes mellitus (CMS/HCC)    • Headache    • Hypertension    • Knee pain    • Low back pain    • Neck pain        Family History   Problem Relation Age of Onset   • Diabetes Maternal Grandmother        Social History     Socioeconomic History   • Marital status:  "     Spouse name: Not on file   • Number of children: Not on file   • Years of education: Not on file   • Highest education level: Not on file   Tobacco Use   • Smoking status: Never Smoker   • Smokeless tobacco: Never Used   Substance and Sexual Activity   • Alcohol use: Yes     Comment: socially    • Drug use: No   • Sexual activity: Defer       Past Surgical History:   Procedure Laterality Date   • BRONCHOSCOPY N/A 1/30/2020    Procedure: BRONCHOSCOPY WITH RIGHT UPPER LOBE BAL AND BIOPSIES;  Surgeon: Neri Aguilera MD;  Location: [facility] ENDOSCOPY;  Service: Pulmonary       The following portions of the patient's history were reviewed and updated as appropriate: problem list, allergies, current medications, past medical history and past social history.    Review of Systems    Immunization History   Administered Date(s) Administered   • Flu Vaccine Quad PF >36MO 09/20/2017, 10/21/2017   • Flu Vaccine Split Quad 10/26/2018   • Flulaval/Fluarix/Fluzone Quad 10/30/2020   • Hepatitis B 05/24/2019, 07/05/2019, 11/22/2019   • Hepatitis B Vaccine Adult IM 05/24/2019, 07/05/2019, 11/22/2019   • Influenza, Unspecified 12/02/2019   • Pneumococcal Conjugate 13-Valent (PCV13) 09/28/2018   • Pneumococcal Polysaccharide (PPSV23) 09/04/2015   • Td 09/01/2004   • Tdap 08/22/2014   • flucelvax quad pfs =>4 YRS 12/02/2019       Objective   Vitals:    07/30/21 1301   BP: 108/74   Pulse: 84   Resp: 14   Temp: 97.5 °F (36.4 °C)   Weight: 103 kg (228 lb)   Height: 182.9 cm (72\")     Body mass index is 30.92 kg/m².  Physical Exam  Vitals reviewed.   Constitutional:       Appearance: Normal appearance. He is well-developed. He is obese.   HENT:      Head: Normocephalic and atraumatic.   Cardiovascular:      Rate and Rhythm: Normal rate and regular rhythm.      Heart sounds: Normal heart sounds, S1 normal and S2 normal.   Pulmonary:      Effort: Pulmonary effort is normal.      Breath sounds: Normal breath sounds.   Skin:     " General: Skin is warm and dry.   Neurological:      Mental Status: He is alert.   Psychiatric:         Mood and Affect: Mood normal.         Behavior: Behavior normal.         Procedures    Assessment/Plan   Diagnoses and all orders for this visit:    1. Type 2 diabetes mellitus without complication, without long-term current use of insulin (CMS/[facility]) (Primary)  Comments:  Controlled. A1C 6.4%. Continue LSA, Metformin, Stelatro and Glipizide as below. He will schedule his eye exam.  Orders:  -     atorvastatin (LIPITOR) 10 MG tablet; Take 1 tablet by mouth Daily.  Dispense: 90 tablet; Refill: 3  -     Ertugliflozin L-PyroglutamicAc (STEGLATRO) 5 MG tablet; Take 1 tablet by mouth Every Morning.  Dispense: 30 tablet; Refill: 5  -     glipizide (GLUCOTROL) 10 MG tablet; Take 1 tablet by mouth 2 (Two) Times a Day Before Meals.  Dispense: 180 tablet; Refill: 1  -     metFORMIN (Glucophage) 1000 MG tablet; Take 1 tablet by mouth 2 (Two) Times a Day With Meals.  Dispense: 180 tablet; Refill: 1  -     Comprehensive Metabolic Panel; Future  -     Lipid Panel With / Chol / HDL Ratio; Future  -     Hemoglobin A1c; Future  -     Microalbumin / Creatinine Urine Ratio - Urine, Clean Catch    2. Essential hypertension  Comments:  Controlled. Refill Lisinopril. Continue weight loss.   Orders:  -     lisinopril (PRINIVIL,ZESTRIL) 20 MG tablet; Take 1 tablet by mouth every night at bedtime.  Dispense: 90 tablet; Refill: 1    3. Gastroesophageal reflux disease without esophagitis  Comments:  Controlled. Refill Omeprazole as below.   Orders:  -     omeprazole (priLOSEC) 40 MG capsule; Take 1 capsule by mouth Daily.  Dispense: 90 capsule; Refill: 3    4. Class 1 obesity without serious comorbidity with body mass index (BMI) of 33.0 to 33.9 in adult, unspecified obesity type  Comments:  Continue weight loss with LSM. Goal 220#  at next visit in November.     5. Healthcare maintenance  Comments:  COVID vaccination advised.     Records  reviewed include previous OV with myself as well as labs.       Return in about 16 weeks (around 11/19/2021).

## 2021-07-31 LAB
ALBUMIN/CREAT UR: <5 MG/G CREAT (ref 0–29)
CREAT UR-MCNC: 63.5 MG/DL
MICROALBUMIN UR-MCNC: <3 UG/ML

## 2021-08-14 NOTE — TELEPHONE ENCOUNTER
[facility]  Progress Note    Daiana Jackson Patient Status:  Observation    10/9/1939 MRN [id number]   [facility] 3NE-A Attending Karlee Gonzalez, DO   Hosp Day # 0 PCP Oswaldo Argueta     Subjective:   The patient is resting in be Sent in device   Ajay Zuniga, personally performed the services described in this documentation  by Ms Elvia Salas, and they are both accurate and complete. Inna Arias.  MD Dwain FACS  [facility]

## 2021-10-25 ENCOUNTER — TELEPHONE (OUTPATIENT)
Dept: INTERNAL MEDICINE | Facility: CLINIC | Age: 46
End: 2021-10-25

## 2021-10-25 NOTE — TELEPHONE ENCOUNTER
Caller: Darvin Leonard    Relationship to patient: Self    Best call back number: [phone]    Patient is needing: PATIENT IS NEEDING A PRIOR AUTHORIZATION FOR Ertugliflozin L-PyroglutamicAc (STEGLATRO) 5 MG tablet AND HIS PRESCRIPTIONS THROUGH HIS NEW INSURANCE. [facility] FAXED OVER A FORM YESTERDAY FOR DEBBY KEVIN NURSE TO FILL OUT. PLEASE FILL OUT AND FAX BACK.

## 2021-11-18 DIAGNOSIS — K21.9 GASTROESOPHAGEAL REFLUX DISEASE WITHOUT ESOPHAGITIS: Chronic | ICD-10-CM

## 2021-11-18 DIAGNOSIS — I10 ESSENTIAL HYPERTENSION: Chronic | ICD-10-CM

## 2021-11-18 DIAGNOSIS — E11.9 TYPE 2 DIABETES MELLITUS WITHOUT COMPLICATION, WITHOUT LONG-TERM CURRENT USE OF INSULIN (HCC): Chronic | ICD-10-CM

## 2021-11-18 RX ORDER — OMEPRAZOLE 40 MG/1
40 CAPSULE, DELAYED RELEASE ORAL DAILY
Qty: 90 CAPSULE | Refills: 3 | Status: SHIPPED | OUTPATIENT
Start: 2021-11-18 | End: 2022-03-25 | Stop reason: SDUPTHER

## 2021-11-18 RX ORDER — ATORVASTATIN CALCIUM 10 MG/1
10 TABLET, FILM COATED ORAL DAILY
Qty: 90 TABLET | Refills: 3 | Status: SHIPPED | OUTPATIENT
Start: 2021-11-18 | End: 2022-03-25 | Stop reason: SDUPTHER

## 2021-11-18 RX ORDER — LISINOPRIL 20 MG/1
20 TABLET ORAL
Qty: 90 TABLET | Refills: 1 | Status: SHIPPED | OUTPATIENT
Start: 2021-11-18 | End: 2022-03-25 | Stop reason: SDUPTHER

## 2021-11-18 RX ORDER — GLIPIZIDE 10 MG/1
10 TABLET ORAL
Qty: 180 TABLET | Refills: 1 | Status: SHIPPED | OUTPATIENT
Start: 2021-11-18 | End: 2022-03-25 | Stop reason: SDUPTHER

## 2021-11-18 NOTE — TELEPHONE ENCOUNTER
Caller: Darvin Leonard    Relationship: Self    Best call back number: [phone]    Requested Prescriptions:   Requested Prescriptions     Pending Prescriptions Disp Refills   • atorvastatin (LIPITOR) 10 MG tablet 90 tablet 3     Sig: Take 1 tablet by mouth Daily.   • Ertugliflozin L-PyroglutamicAc (STEGLATRO) 5 MG tablet 30 tablet 5     Sig: Take 1 tablet by mouth Every Morning.   • glipizide (GLUCOTROL) 10 MG tablet 180 tablet 1     Sig: Take 1 tablet by mouth 2 (Two) Times a Day Before Meals.   • lisinopril (PRINIVIL,ZESTRIL) 20 MG tablet 90 tablet 1     Sig: Take 1 tablet by mouth every night at bedtime.   • metFORMIN (Glucophage) 1000 MG tablet 180 tablet 1     Sig: Take 1 tablet by mouth 2 (Two) Times a Day With Meals.   • omeprazole (priLOSEC) 40 MG capsule 90 capsule 3     Sig: Take 1 capsule by mouth Daily.        Pharmacy where request should be sent: [facility] [address] [place] [address] [phone] [facility] [phone]      Additional details provided by patient: PATIENT WAS SCHEDULED TO SEE DEBBY KEVIN TOMORROW 11/19/21 BUT APPOINTMENT WAS CANCELLED DUE TO PROVIDER BEING OUT OF OFFICE AND RESCHEDULED FOR 12/17/21. HE WILL RUN OUT OF HIS MEDICATION BEFORE HIS APPOINTMENT ON 12/17/21. CAN WE SEND IN ENOUGH FOR HIM UNTIL HIS APPOINTMENT?    PLEASE ADVISE PATIENT, WOULD LIKE A CALLBACK FROM SHARAN    Does the patient have less than a 3 day supply:  [] Yes  [x] No    Indra Stafford Rep   11/18/21 15:28 EST

## 2021-11-29 ENCOUNTER — TELEPHONE (OUTPATIENT)
Dept: INTERNAL MEDICINE | Facility: CLINIC | Age: 46
End: 2021-11-29

## 2021-11-29 NOTE — TELEPHONE ENCOUNTER
Yes--3 months with A1C and BMP prior. If A1C still shows good control will discuss going down to every 6 months.

## 2021-11-29 NOTE — TELEPHONE ENCOUNTER
Caller: Darvin Leonard    Relationship to patient: Self    Best call back number: [phone]    Patient is needing: PATIENT CALLED IN AND WANTED TO KNOW IF HE CAN RESCHEDULE HIS OUT FOR ANOTHER 3-6 MONTHS SINCE HIS LABS CAME BACK GOOD. HE SAID THAT HE HAS A LOT OF DIFFICULTY WITH TAKING OFF WORK AT HIS JOB. PLEASE CALL PATIENT AND ADVISE.

## 2022-03-22 DIAGNOSIS — I10 ESSENTIAL HYPERTENSION: ICD-10-CM

## 2022-03-22 DIAGNOSIS — E11.9 TYPE 2 DIABETES MELLITUS WITHOUT COMPLICATION, WITHOUT LONG-TERM CURRENT USE OF INSULIN: Primary | ICD-10-CM

## 2022-03-23 PROBLEM — E16.2 HYPOGLYCEMIA: Status: RESOLVED | Noted: 2020-01-29 | Resolved: 2022-03-23

## 2022-03-23 PROBLEM — E11.9 TYPE 2 DIABETES MELLITUS, WITHOUT LONG-TERM CURRENT USE OF INSULIN (HCC): Chronic | Status: ACTIVE | Noted: 2019-05-23

## 2022-03-23 PROBLEM — E66.9 OBESITY (BMI 30.0-34.9): Chronic | Status: ACTIVE | Noted: 2019-05-24

## 2022-03-23 PROBLEM — I10 ESSENTIAL HYPERTENSION: Chronic | Status: ACTIVE | Noted: 2019-05-23

## 2022-03-23 PROBLEM — E66.811 OBESITY (BMI 30.0-34.9): Chronic | Status: ACTIVE | Noted: 2019-05-24

## 2022-03-23 NOTE — PROGRESS NOTES
Subjective   Chief Complaint   Patient presents with   • Diabetes   • Hypertension       History of Present Illness   46 y.o. male presents for follow up regarding DM2, HTN, and obesity. Denies CP or dyspnea. Eye exam du in August. Continues walking and exercises weekly averages 30 minutes at least 2x weekly. Forgot to get his flu shot this year. Declines COVID vaccination.          Patient Active Problem List   Diagnosis   • Essential hypertension   • Type 2 diabetes mellitus, without long-term current use of insulin (HCC)   • Obesity (BMI 30.0-34.9)   • Gastroesophageal reflux disease without esophagitis   • DERIC on CPAP       No Known Allergies    Current Outpatient Medications on File Prior to Visit   Medication Sig Dispense Refill   • [DISCONTINUED] atorvastatin (LIPITOR) 10 MG tablet Take 1 tablet by mouth Daily. 90 tablet 3   • [DISCONTINUED] Ertugliflozin L-PyroglutamicAc (STEGLATRO) 5 MG tablet Take 1 tablet by mouth Every Morning. 30 tablet 5   • [DISCONTINUED] glipizide (GLUCOTROL) 10 MG tablet Take 1 tablet by mouth 2 (Two) Times a Day Before Meals. 180 tablet 1   • [DISCONTINUED] lisinopril (PRINIVIL,ZESTRIL) 20 MG tablet Take 1 tablet by mouth every night at bedtime. 90 tablet 1   • [DISCONTINUED] metFORMIN (Glucophage) 1000 MG tablet Take 1 tablet by mouth 2 (Two) Times a Day With Meals. 180 tablet 1   • [DISCONTINUED] omeprazole (priLOSEC) 40 MG capsule Take 1 capsule by mouth Daily. 90 capsule 3     No current facility-administered medications on file prior to visit.       Past Medical History:   Diagnosis Date   • Abscess of upper lobe of right lung with pneumonia (HCC) 3/13/2020   • Headache    • Knee pain    • Low back pain    • Neck pain        Family History   Problem Relation Age of Onset   • Diabetes Maternal Grandmother        Social History     Socioeconomic History   • Marital status:    Tobacco Use   • Smoking status: Never Smoker   • Smokeless tobacco: Never Used   Substance and  "Sexual Activity   • Alcohol use: Yes     Comment: socially    • Drug use: No   • Sexual activity: Defer       Past Surgical History:   Procedure Laterality Date   • BRONCHOSCOPY N/A 1/30/2020    Procedure: BRONCHOSCOPY WITH RIGHT UPPER LOBE BAL AND BIOPSIES;  Surgeon: Neri Aguilera MD;  Location: [facility] ENDOSCOPY;  Service: Pulmonary       The following portions of the patient's history were reviewed and updated as appropriate: problem list, allergies, current medications, past medical history and past social history.    Review of Systems    Immunization History   Administered Date(s) Administered   • Flu Vaccine Quad PF >36MO 09/20/2017, 10/21/2017   • Flu Vaccine Split Quad 10/26/2018   • FluLaval/Fluarix/Fluzone >6 10/30/2020   • Hepatitis B 05/24/2019, 07/05/2019, 11/22/2019   • Hepatitis B Vaccine Adult IM 05/24/2019, 07/05/2019, 11/22/2019   • Influenza, Unspecified 12/02/2019   • Pneumococcal Conjugate 13-Valent (PCV13) 09/28/2018   • Pneumococcal Polysaccharide (PPSV23) 09/04/2015   • Td 09/01/2004   • Tdap 08/22/2014   • flucelvax quad pfs =>4 YRS 12/02/2019       Objective   Vitals:    03/25/22 1001   Temp: 97.5 °F (36.4 °C)   Weight: 102 kg (225 lb 9.6 oz)   Height: 182.9 cm (72\")     Body mass index is 30.6 kg/m².  Physical Exam  Vitals reviewed.   Constitutional:       Appearance: Normal appearance. He is well-developed.   HENT:      Head: Normocephalic and atraumatic.   Cardiovascular:      Rate and Rhythm: Normal rate and regular rhythm.      Heart sounds: Normal heart sounds, S1 normal and S2 normal.   Pulmonary:      Effort: Pulmonary effort is normal.      Breath sounds: Normal breath sounds.   Musculoskeletal:        Feet:    Feet:      Right foot:      Protective Sensation: 10 sites tested. 9 sites sensed.      Left foot:      Protective Sensation: 10 sites tested. 10 sites sensed.   Skin:     General: Skin is warm and dry.   Neurological:      Mental Status: He is alert.   Psychiatric:        "  Mood and Affect: Mood normal.         Behavior: Behavior normal.         Procedures    Assessment/Plan   Diagnoses and all orders for this visit:    1. Type 2 diabetes mellitus without complication, without long-term current use of insulin (HCC) (Primary)  Comments:  A1C controlled at 6.3%. Continue current medications. Eye exam due in August. Foot care reviewed. Forgot to have his flu shot. Declines COVID vaccination.   Orders:  -     atorvastatin (LIPITOR) 10 MG tablet; Take 1 tablet by mouth Daily.  Dispense: 90 tablet; Refill: 3  -     Ertugliflozin L-PyroglutamicAc (STEGLATRO) 5 MG tablet; Take 1 tablet by mouth Every Morning.  Dispense: 30 tablet; Refill: 5  -     glipizide (GLUCOTROL) 10 MG tablet; Take 1 tablet by mouth 2 (Two) Times a Day Before Meals.  Dispense: 180 tablet; Refill: 1  -     metFORMIN (Glucophage) 1000 MG tablet; Take 1 tablet by mouth 2 (Two) Times a Day With Meals.  Dispense: 180 tablet; Refill: 1  -     Comprehensive Metabolic Panel; Future  -     Hemoglobin A1c; Future  -     Lipid Panel With / Chol / HDL Ratio; Future  -     Microalbumin / Creatinine Urine Ratio - Urine, Clean Catch; Future    2. Essential hypertension  Comments:  Controlled. Continue current medications.   Orders:  -     lisinopril (PRINIVIL,ZESTRIL) 20 MG tablet; Take 1 tablet by mouth every night at bedtime.  Dispense: 90 tablet; Refill: 1    3. Obesity (BMI 30.0-34.9)  Comments:  Weight loss via dietary changes and 150 minutes weekly aerobic exercise advised.     4. Gastroesophageal reflux disease without esophagitis  Comments:  Controlled. Refill Omeprazole as below.   Orders:  -     omeprazole (priLOSEC) 40 MG capsule; Take 1 capsule by mouth Daily.  Dispense: 90 capsule; Refill: 3    Records reviewed include previous OV with myself as well as labs.     Return in about 19 weeks (around 8/5/2022) for Lab B4 FUP, Annual physical.

## 2022-03-24 LAB
BUN SERPL-MCNC: 12 MG/DL (ref 6–24)
BUN/CREAT SERPL: 16 (ref 9–20)
CALCIUM SERPL-MCNC: 9.8 MG/DL (ref 8.7–10.2)
CHLORIDE SERPL-SCNC: 102 MMOL/L (ref 96–106)
CO2 SERPL-SCNC: 21 MMOL/L (ref 20–29)
CREAT SERPL-MCNC: 0.73 MG/DL (ref 0.76–1.27)
EGFRCR SERPLBLD CKD-EPI 2021: 114 ML/MIN/1.73
GLUCOSE SERPL-MCNC: 55 MG/DL (ref 65–99)
HBA1C MFR BLD: 6.3 % (ref 4.8–5.6)
POTASSIUM SERPL-SCNC: 4 MMOL/L (ref 3.5–5.2)
SODIUM SERPL-SCNC: 140 MMOL/L (ref 134–144)

## 2022-03-25 ENCOUNTER — OFFICE VISIT (OUTPATIENT)
Dept: INTERNAL MEDICINE | Facility: CLINIC | Age: 47
End: 2022-03-25

## 2022-03-25 VITALS
WEIGHT: 225.6 LBS | DIASTOLIC BLOOD PRESSURE: 78 MMHG | BODY MASS INDEX: 30.56 KG/M2 | HEIGHT: 72 IN | TEMPERATURE: 97.5 F | SYSTOLIC BLOOD PRESSURE: 126 MMHG | HEART RATE: 84 BPM | RESPIRATION RATE: 16 BRPM

## 2022-03-25 DIAGNOSIS — I10 ESSENTIAL HYPERTENSION: Chronic | ICD-10-CM

## 2022-03-25 DIAGNOSIS — K21.9 GASTROESOPHAGEAL REFLUX DISEASE WITHOUT ESOPHAGITIS: Chronic | ICD-10-CM

## 2022-03-25 DIAGNOSIS — E66.9 OBESITY (BMI 30.0-34.9): Chronic | ICD-10-CM

## 2022-03-25 DIAGNOSIS — E11.9 TYPE 2 DIABETES MELLITUS WITHOUT COMPLICATION, WITHOUT LONG-TERM CURRENT USE OF INSULIN: Primary | Chronic | ICD-10-CM

## 2022-03-25 PROCEDURE — 99214 OFFICE O/P EST MOD 30 MIN: CPT | Performed by: NURSE PRACTITIONER

## 2022-03-25 RX ORDER — LISINOPRIL 20 MG/1
20 TABLET ORAL
Qty: 90 TABLET | Refills: 1 | Status: SHIPPED | OUTPATIENT
Start: 2022-03-25 | End: 2022-08-05 | Stop reason: SDUPTHER

## 2022-03-25 RX ORDER — GLIPIZIDE 10 MG/1
10 TABLET ORAL
Qty: 180 TABLET | Refills: 1 | Status: SHIPPED | OUTPATIENT
Start: 2022-03-25 | End: 2022-08-05 | Stop reason: SDUPTHER

## 2022-03-25 RX ORDER — OMEPRAZOLE 40 MG/1
40 CAPSULE, DELAYED RELEASE ORAL DAILY
Qty: 90 CAPSULE | Refills: 3 | Status: SHIPPED | OUTPATIENT
Start: 2022-03-25 | End: 2022-08-05 | Stop reason: SDUPTHER

## 2022-03-25 RX ORDER — ATORVASTATIN CALCIUM 10 MG/1
10 TABLET, FILM COATED ORAL DAILY
Qty: 90 TABLET | Refills: 3 | Status: SHIPPED | OUTPATIENT
Start: 2022-03-25 | End: 2022-08-05 | Stop reason: SDUPTHER

## 2022-03-25 NOTE — PATIENT INSTRUCTIONS
Your A1C this time was 6.3%--that's great!. In November it was 6.4%. You are doing well--continue to watch your weight and eat well. I encourage you to exercise 150 minutes a week. Keep up the good work.    Gaol for your A1C is less than 7%.

## 2022-08-02 NOTE — PROGRESS NOTES
Subjective   Chief Complaint   Patient presents with   • Annual Exam       History of Present Illness   46 y.o. male presents for annual physical. He is down another 6#. Eye exam not yet scheduled. Denies chest pain or dyspnea. Some days he feels overwhelmed and he is interested in taking something for anxiety. Not affecting his sleep. Energy is good. Still enjoys walking several times a week. Work is very stressful the last few months. No S/H ideation or FH anxiety.     Cls UTD with Dr. Bolton.      Patient Active Problem List   Diagnosis   • Essential hypertension   • Type 2 diabetes mellitus, without long-term current use of insulin (HCC)   • Obesity (BMI 30.0-34.9)   • Gastroesophageal reflux disease without esophagitis   • DERIC on CPAP       No Known Allergies    No current outpatient medications on file prior to visit.     No current facility-administered medications on file prior to visit.       Past Medical History:   Diagnosis Date   • Abscess of upper lobe of right lung with pneumonia (HCC) 3/13/2020   • Headache    • Knee pain    • Low back pain    • Neck pain        Family History   Problem Relation Age of Onset   • Diabetes Maternal Grandmother        Social History     Socioeconomic History   • Marital status:    Tobacco Use   • Smoking status: Never Smoker   • Smokeless tobacco: Never Used   Substance and Sexual Activity   • Alcohol use: Yes     Comment: socially    • Drug use: No   • Sexual activity: Defer       Past Surgical History:   Procedure Laterality Date   • BRONCHOSCOPY N/A 1/30/2020    Procedure: BRONCHOSCOPY WITH RIGHT UPPER LOBE BAL AND BIOPSIES;  Surgeon: Neri Aguilera MD;  Location: [facility] ENDOSCOPY;  Service: Pulmonary       The following portions of the patient's history were reviewed and updated as appropriate: problem list, allergies, current medications, past medical history, past family history, past social history and past surgical history.    Review of Systems  "  Constitutional: Negative for fatigue.   HENT: Negative for congestion and hearing loss.    Eyes: Negative for visual disturbance.   Respiratory: Negative for shortness of breath.    Cardiovascular: Negative for chest pain.   Gastrointestinal: Negative for blood in stool.   Endocrine: Negative.    Genitourinary: Negative.    Musculoskeletal: Negative for arthralgias.   Skin: Negative for rash.   Allergic/Immunologic: Negative for environmental allergies.   Neurological: Negative for headache.   Hematological: Does not bruise/bleed easily.   Psychiatric/Behavioral: The patient is nervous/anxious.        Immunization History   Administered Date(s) Administered   • Flu Vaccine Quad PF >36MO 09/20/2017, 10/21/2017   • Flu Vaccine Split Quad 10/26/2018   • FluLaval/Fluzone >6mos 10/30/2020   • Hepatitis B 05/24/2019, 07/05/2019, 11/22/2019   • Hepatitis B Vaccine Adult IM 05/24/2019, 07/05/2019, 11/22/2019   • Influenza, Unspecified 12/02/2019   • Pneumococcal Conjugate 13-Valent (PCV13) 09/28/2018   • Pneumococcal Polysaccharide (PPSV23) 09/04/2015   • Td 09/01/2004   • Tdap 08/22/2014   • flucelvax quad pfs =>4 YRS 12/02/2019       Objective   Vitals:    08/05/22 0837   BP: 112/70   Pulse: 64   Resp: 14   Temp: 97.5 °F (36.4 °C)   Weight: 99.3 kg (219 lb)   Height: 182.9 cm (72\")     Body mass index is 29.7 kg/m².  Physical Exam  Vitals reviewed.   Constitutional:       Appearance: Normal appearance. He is well-developed.   HENT:      Head: Normocephalic and atraumatic.      Right Ear: Tympanic membrane, ear canal and external ear normal. There is impacted cerumen.      Left Ear: Tympanic membrane, ear canal and external ear normal. There is impacted cerumen.      Nose: Nose normal.      Mouth/Throat:      Mouth: Mucous membranes are moist.      Pharynx: Oropharynx is clear. No oropharyngeal exudate or posterior oropharyngeal erythema.   Eyes:      General: No scleral icterus.     Extraocular Movements: Extraocular " movements intact.      Conjunctiva/sclera: Conjunctivae normal.      Pupils: Pupils are equal, round, and reactive to light.   Neck:      Thyroid: No thyromegaly.      Vascular: No carotid bruit.   Cardiovascular:      Rate and Rhythm: Normal rate and regular rhythm.      Heart sounds: Normal heart sounds. No murmur heard.  Pulmonary:      Effort: Pulmonary effort is normal.      Breath sounds: Normal breath sounds.   Abdominal:      General: Bowel sounds are normal. There is no distension.      Palpations: Abdomen is soft.      Tenderness: There is no abdominal tenderness.   Genitourinary:     Prostate: Normal.      Rectum: Normal.   Musculoskeletal:      Cervical back: Neck supple.      Comments: Ambulates without assistance; no clubbing, cyanosis or edema.    Lymphadenopathy:      Cervical: No cervical adenopathy.   Skin:     General: Skin is warm and dry.   Neurological:      Mental Status: He is alert.   Psychiatric:         Mood and Affect: Mood normal.         Behavior: Behavior normal.         Procedures    Assessment & Plan   Diagnoses and all orders for this visit:    Diagnoses and all orders for this visit:    1. Annual physical exam (Primary)  Comments:  Weight loss via dietary changes and 150 minutes aerobic exercise. Flu shot advised in the fall. Declines COVID vaccination.     2. Type 2 diabetes mellitus without complication, without long-term current use of insulin ([facility])  Comments:  Controlled with A1C 6.4%. Continue current medications. He will schedule his eye exam.   Orders:  -     Hemoglobin A1c; Future  -     Comprehensive Metabolic Panel; Future  -     atorvastatin (LIPITOR) 10 MG tablet; Take 1 tablet by mouth Daily.  Dispense: 90 tablet; Refill: 3  -     Ertugliflozin L-PyroglutamicAc (STEGLATRO) 5 MG tablet; Take 1 tablet by mouth Every Morning.  Dispense: 30 tablet; Refill: 5  -     glipizide (GLUCOTROL) 10 MG tablet; Take 1 tablet by mouth 2 (Two) Times a Day Before Meals.  Dispense: 180  tablet; Refill: 1    3. Essential hypertension  Comments:  Controlled. Continue current medications.   Orders:  -     lisinopril (PRINIVIL,ZESTRIL) 20 MG tablet; Take 1 tablet by mouth every night at bedtime.  Dispense: 90 tablet; Refill: 1  -     metFORMIN (Glucophage) 1000 MG tablet; Take 1 tablet by mouth 2 (Two) Times a Day With Meals.  Dispense: 180 tablet; Refill: 1    4. Anxiety  Comments:  New problem. Start Lexapro 10 mg daily. Potential side effects reviewed. RTO 8 weeks.   Orders:  -     escitalopram (Lexapro) 10 MG tablet; Take 1 tablet by mouth Daily.  Dispense: 90 tablet; Refill: 0    5. Gastroesophageal reflux disease without esophagitis  Comments:  Controlled. Refill Omeprazole as below.   Orders:  -     omeprazole (priLOSEC) 40 MG capsule; Take 1 capsule by mouth Daily.  Dispense: 90 capsule; Refill: 3    6. Bilateral impacted cerumen  Comments:  Lavage today with success and well tolerated.     7. Prostate cancer screening  -     Cancel: PSA Screen; Future  -     PSA Screen    Records reviewed include previous OV with myself as well as labs.     Return in about 8 weeks (around 9/30/2022) for Lab B4 FUP, Lab Today.

## 2022-08-05 ENCOUNTER — OFFICE VISIT (OUTPATIENT)
Dept: INTERNAL MEDICINE | Facility: CLINIC | Age: 47
End: 2022-08-05

## 2022-08-05 VITALS
RESPIRATION RATE: 14 BRPM | SYSTOLIC BLOOD PRESSURE: 112 MMHG | BODY MASS INDEX: 29.66 KG/M2 | HEIGHT: 72 IN | DIASTOLIC BLOOD PRESSURE: 70 MMHG | HEART RATE: 64 BPM | TEMPERATURE: 97.5 F | WEIGHT: 219 LBS

## 2022-08-05 DIAGNOSIS — E11.9 TYPE 2 DIABETES MELLITUS WITHOUT COMPLICATION, WITHOUT LONG-TERM CURRENT USE OF INSULIN: Chronic | ICD-10-CM

## 2022-08-05 DIAGNOSIS — F41.9 ANXIETY: ICD-10-CM

## 2022-08-05 DIAGNOSIS — Z12.5 PROSTATE CANCER SCREENING: ICD-10-CM

## 2022-08-05 DIAGNOSIS — Z00.00 ANNUAL PHYSICAL EXAM: Primary | ICD-10-CM

## 2022-08-05 DIAGNOSIS — H61.23 BILATERAL IMPACTED CERUMEN: ICD-10-CM

## 2022-08-05 DIAGNOSIS — I10 ESSENTIAL HYPERTENSION: Chronic | ICD-10-CM

## 2022-08-05 DIAGNOSIS — K21.9 GASTROESOPHAGEAL REFLUX DISEASE WITHOUT ESOPHAGITIS: Chronic | ICD-10-CM

## 2022-08-05 PROCEDURE — 69209 REMOVE IMPACTED EAR WAX UNI: CPT | Performed by: NURSE PRACTITIONER

## 2022-08-05 PROCEDURE — 99214 OFFICE O/P EST MOD 30 MIN: CPT | Performed by: NURSE PRACTITIONER

## 2022-08-05 PROCEDURE — 99396 PREV VISIT EST AGE 40-64: CPT | Performed by: NURSE PRACTITIONER

## 2022-08-05 RX ORDER — OMEPRAZOLE 40 MG/1
40 CAPSULE, DELAYED RELEASE ORAL DAILY
Qty: 90 CAPSULE | Refills: 3 | Status: SHIPPED | OUTPATIENT
Start: 2022-08-05 | End: 2023-03-15 | Stop reason: SDUPTHER

## 2022-08-05 RX ORDER — GLIPIZIDE 10 MG/1
10 TABLET ORAL
Qty: 180 TABLET | Refills: 1 | Status: SHIPPED | OUTPATIENT
Start: 2022-08-05 | End: 2023-03-15 | Stop reason: SDUPTHER

## 2022-08-05 RX ORDER — ESCITALOPRAM OXALATE 10 MG/1
10 TABLET ORAL DAILY
Qty: 90 TABLET | Refills: 0 | Status: SHIPPED | OUTPATIENT
Start: 2022-08-05 | End: 2022-09-13 | Stop reason: DRUGHIGH

## 2022-08-05 RX ORDER — LISINOPRIL 20 MG/1
20 TABLET ORAL
Qty: 90 TABLET | Refills: 1 | Status: SHIPPED | OUTPATIENT
Start: 2022-08-05 | End: 2023-03-15 | Stop reason: SDUPTHER

## 2022-08-05 RX ORDER — ATORVASTATIN CALCIUM 10 MG/1
10 TABLET, FILM COATED ORAL DAILY
Qty: 90 TABLET | Refills: 3 | Status: SHIPPED | OUTPATIENT
Start: 2022-08-05 | End: 2023-03-15 | Stop reason: SDUPTHER

## 2022-09-13 ENCOUNTER — TELEPHONE (OUTPATIENT)
Dept: INTERNAL MEDICINE | Facility: CLINIC | Age: 47
End: 2022-09-13

## 2022-09-13 DIAGNOSIS — F41.9 ANXIETY: Primary | ICD-10-CM

## 2022-09-13 RX ORDER — ESCITALOPRAM OXALATE 20 MG/1
20 TABLET ORAL DAILY
Qty: 90 TABLET | Refills: 3 | Status: SHIPPED | OUTPATIENT
Start: 2022-09-13 | End: 2023-03-15 | Stop reason: SDUPTHER

## 2022-09-13 NOTE — TELEPHONE ENCOUNTER
Patient would like to increase lexapro to 20 mg daily. He has been taking the 10 in the morning and needing another one in the afternoon. Is this ok to send in 20 mg?

## 2022-09-13 NOTE — TELEPHONE ENCOUNTER
Caller: Darvin Leonard    Relationship to patient: Self    Best call back number: [phone]    Patient is needing: WANTS TO SPEAK TO DEBBY STAHL CONCERNING NEW MEDICATION

## 2022-11-22 ENCOUNTER — TELEPHONE (OUTPATIENT)
Dept: INTERNAL MEDICINE | Facility: CLINIC | Age: 47
End: 2022-11-22

## 2022-11-22 NOTE — TELEPHONE ENCOUNTER
Caller: Darvin Leonard    Relationship: Self    Best call back number: [phone]    What medications are you currently taking:   Current Outpatient Medications on File Prior to Visit   Medication Sig Dispense Refill   • atorvastatin (LIPITOR) 10 MG tablet Take 1 tablet by mouth Daily. 90 tablet 3   • Ertugliflozin L-PyroglutamicAc (STEGLATRO) 5 MG tablet Take 1 tablet by mouth Every Morning. 30 tablet 5   • escitalopram (Lexapro) 20 MG tablet Take 1 tablet by mouth Daily. 90 tablet 3   • glipizide (GLUCOTROL) 10 MG tablet Take 1 tablet by mouth 2 (Two) Times a Day Before Meals. 180 tablet 1   • lisinopril (PRINIVIL,ZESTRIL) 20 MG tablet Take 1 tablet by mouth every night at bedtime. 90 tablet 1   • metFORMIN (Glucophage) 1000 MG tablet Take 1 tablet by mouth 2 (Two) Times a Day With Meals. 180 tablet 1   • omeprazole (priLOSEC) 40 MG capsule Take 1 capsule by mouth Daily. 90 capsule 3     No current facility-administered medications on file prior to visit.          When did you start taking these medications: 08/05/22    Which medication are you concerned about:   Ertugliflozin L-PyroglutamicAc (STEGLATRO) 5 MG tablet  5 mg, Every Morning         Who prescribed you this medication: DEBBY KEVIN     What are your concerns: INSURANCE IS DENYING COVERAGE ON THIS MEDICATION AND PATIENT IS WANTING TO SEE IF HE CAN GET AN ALTERNATIVE MEDICATION SENT IN FOR HIM OR IF HE NEEDS A PRIOR AUTHORIZATION     How long have you had these concerns: N/A

## 2022-11-29 ENCOUNTER — TELEPHONE (OUTPATIENT)
Dept: INTERNAL MEDICINE | Facility: CLINIC | Age: 47
End: 2022-11-29

## 2022-11-29 NOTE — TELEPHONE ENCOUNTER
Caller: Darvin Leonard    Relationship: Self    Best call back number:    [phone] (Home)       What is the best time to reach you: ANY     Who are you requesting to speak with (clinical staff, provider,  specific staff member): CLINICAL     What was the call regarding: PATIENT IS HAVING ISSUES WITH INSURANCE NOT COVERING MEDICATION.     Do you require a callback:YES   No

## 2022-12-02 DIAGNOSIS — E11.9 TYPE 2 DIABETES MELLITUS WITHOUT COMPLICATION, WITHOUT LONG-TERM CURRENT USE OF INSULIN: Chronic | ICD-10-CM

## 2022-12-27 NOTE — PROGRESS NOTES
Subjective   Chief Complaint   Patient presents with   • Hypertension   • Diabetes   • Depression   • Obesity       History of Present Illness   47 y.o. male presents for follow up regarding DM2, HTN and obesity. Recently lost his mother in October.Brother and sister are wanting to sale the house now.He had an injury with a grocery cart in which he tore his meniscus; getting steroid shots in the knee in hopes of avoiding surgery. Also lost a co-worker recently (aneurysm).  He has been very down, eating his feelings and not going to the gym. Eye exam UTD 11/18. He has gained 31# since last visit.     Patient Active Problem List   Diagnosis   • Essential hypertension   • Type 2 diabetes mellitus, without long-term current use of insulin (HCC)   • Obesity (BMI 30.0-34.9)   • Gastroesophageal reflux disease without esophagitis   • DERIC on CPAP   • Anxiety       No Known Allergies    Current Outpatient Medications on File Prior to Visit   Medication Sig Dispense Refill   • atorvastatin (LIPITOR) 10 MG tablet Take 1 tablet by mouth Daily. 90 tablet 3   • escitalopram (Lexapro) 20 MG tablet Take 1 tablet by mouth Daily. 90 tablet 3   • glipizide (GLUCOTROL) 10 MG tablet Take 1 tablet by mouth 2 (Two) Times a Day Before Meals. 180 tablet 1   • lisinopril (PRINIVIL,ZESTRIL) 20 MG tablet Take 1 tablet by mouth every night at bedtime. 90 tablet 1   • metFORMIN (Glucophage) 1000 MG tablet Take 1 tablet by mouth 2 (Two) Times a Day With Meals. 180 tablet 1   • omeprazole (priLOSEC) 40 MG capsule Take 1 capsule by mouth Daily. 90 capsule 3   • [DISCONTINUED] Ertugliflozin L-PyroglutamicAc (STEGLATRO) 5 MG tablet Take 1 tablet by mouth Every Morning. 30 tablet 5   • [DISCONTINUED] Ertugliflozin L-PyroglutamicAc (STEGLATRO) 5 MG tablet Take 1 tablet by mouth Every Morning.       No current facility-administered medications on file prior to visit.       Past Medical History:   Diagnosis Date   • Abscess of upper lobe of right lung  "with pneumonia (HCC) 3/13/2020   • Headache    • Knee pain    • Low back pain    • Neck pain        Family History   Problem Relation Age of Onset   • Diabetes Maternal Grandmother        Social History     Socioeconomic History   • Marital status:    Tobacco Use   • Smoking status: Never   • Smokeless tobacco: Never   Substance and Sexual Activity   • Alcohol use: Yes     Comment: socially    • Drug use: No   • Sexual activity: Defer       Past Surgical History:   Procedure Laterality Date   • BRONCHOSCOPY N/A 1/30/2020    Procedure: BRONCHOSCOPY WITH RIGHT UPPER LOBE BAL AND BIOPSIES;  Surgeon: Neri Aguilera MD;  Location: [facility] ENDOSCOPY;  Service: Pulmonary       The following portions of the patient's history were reviewed and updated as appropriate: problem list, allergies, current medications, past medical history and past social history.    Review of Systems    Immunization History   Administered Date(s) Administered   • Flu Vaccine Quad PF >36MO 09/20/2017, 10/21/2017   • Flu Vaccine Split Quad 10/26/2018   • FluLaval/Fluzone >6mos 10/30/2020, 12/30/2022   • Hepatitis B 05/24/2019, 07/05/2019, 11/22/2019   • Hepatitis B Vaccine Adult IM 05/24/2019, 07/05/2019, 11/22/2019   • Influenza, Unspecified 12/02/2019   • Pneumococcal Conjugate 13-Valent (PCV13) 09/28/2018   • Pneumococcal Polysaccharide (PPSV23) 09/04/2015   • Td 09/01/2004   • Tdap 08/22/2014   • flucelvax quad pfs =>4 YRS 12/02/2019       Objective   Vitals:    12/30/22 0809   BP: 110/74   Pulse: 84   Resp: 16   Temp: 97.3 °F (36.3 °C)   TempSrc: Temporal   SpO2: 98%   Weight: 113 kg (248 lb 3.2 oz)   Height: 182.9 cm (72\")     Body mass index is 33.66 kg/m².  Physical Exam  Vitals reviewed.   Constitutional:       Appearance: Normal appearance. He is well-developed. He is obese.   HENT:      Head: Normocephalic and atraumatic.   Cardiovascular:      Rate and Rhythm: Normal rate and regular rhythm.      Heart sounds: Normal heart " sounds, S1 normal and S2 normal.   Pulmonary:      Effort: Pulmonary effort is normal.      Breath sounds: Normal breath sounds.   Skin:     General: Skin is warm and dry.   Neurological:      Mental Status: He is alert.   Psychiatric:         Mood and Affect: Mood normal.         Behavior: Behavior normal.         Procedures    Assessment & Plan   Diagnoses and all orders for this visit:    1. Type 2 diabetes mellitus without complication, without long-term current use of insulin (HCC) (Primary)  Comments:  Not at goal with A1C 8.5% after loss of his mother, meniscus injury and weight gain. Increase Steglatro to 15 mg daily and RTO 3 months. Flu shot today.   Orders:  -     Ertugliflozin L-PyroglutamicAc (STEGLATRO) 15 MG tablet; Take 1 tablet by mouth Every Morning.  Dispense: 30 tablet; Refill: 5  -     Hemoglobin A1c; Future  -     Basic Metabolic Panel; Future    2. Essential hypertension  Comments:  Controlled. Continue Lisinopril 20 mg daily.    3. Obesity (BMI 30.0-34.9)  Comments:  Weight loss via dietary changes and 150 minutes weekly aerobic exercise.     4. Anxiety  Comments:  Continues Lexapro 20 mg. Encouraged counseling. Discussed Buspar versus Hydroxyzine but declines at this time.     5. Grief  Comments:  Discussed counseling.     6. Needs flu shot  -     FluLaval/Fluzone >6 mos ([id number])    Records reviewed include previous OV with myself as well as labs.     Return in about 3 months (around 3/30/2023) for Lab B4 FUP.

## 2022-12-30 ENCOUNTER — OFFICE VISIT (OUTPATIENT)
Dept: INTERNAL MEDICINE | Facility: CLINIC | Age: 47
End: 2022-12-30

## 2022-12-30 VITALS
WEIGHT: 248.2 LBS | HEIGHT: 72 IN | HEART RATE: 84 BPM | DIASTOLIC BLOOD PRESSURE: 74 MMHG | BODY MASS INDEX: 33.62 KG/M2 | TEMPERATURE: 97.3 F | OXYGEN SATURATION: 98 % | SYSTOLIC BLOOD PRESSURE: 110 MMHG | RESPIRATION RATE: 16 BRPM

## 2022-12-30 DIAGNOSIS — Z23 NEEDS FLU SHOT: ICD-10-CM

## 2022-12-30 DIAGNOSIS — F41.9 ANXIETY: Chronic | ICD-10-CM

## 2022-12-30 DIAGNOSIS — F43.21 GRIEF: ICD-10-CM

## 2022-12-30 DIAGNOSIS — E66.9 OBESITY (BMI 30.0-34.9): Chronic | ICD-10-CM

## 2022-12-30 DIAGNOSIS — I10 ESSENTIAL HYPERTENSION: Chronic | ICD-10-CM

## 2022-12-30 DIAGNOSIS — E11.9 TYPE 2 DIABETES MELLITUS WITHOUT COMPLICATION, WITHOUT LONG-TERM CURRENT USE OF INSULIN: Primary | Chronic | ICD-10-CM

## 2022-12-30 PROCEDURE — 90471 IMMUNIZATION ADMIN: CPT | Performed by: NURSE PRACTITIONER

## 2022-12-30 PROCEDURE — 99214 OFFICE O/P EST MOD 30 MIN: CPT | Performed by: NURSE PRACTITIONER

## 2022-12-30 PROCEDURE — 90686 IIV4 VACC NO PRSV 0.5 ML IM: CPT | Performed by: NURSE PRACTITIONER

## 2023-01-20 ENCOUNTER — HOSPITAL ENCOUNTER (EMERGENCY)
Facility: HOSPITAL | Age: 48
Discharge: HOME | End: 2023-01-20
Attending: EMERGENCY MEDICINE | Admitting: EMERGENCY MEDICINE
Payer: COMMERCIAL

## 2023-01-20 ENCOUNTER — APPOINTMENT (OUTPATIENT)
Dept: CT IMAGING | Facility: HOSPITAL | Age: 48
End: 2023-01-20
Payer: COMMERCIAL

## 2023-01-20 ENCOUNTER — APPOINTMENT (OUTPATIENT)
Dept: GENERAL RADIOLOGY | Facility: HOSPITAL | Age: 48
End: 2023-01-20
Payer: COMMERCIAL

## 2023-01-20 VITALS
HEART RATE: 68 BPM | RESPIRATION RATE: 20 BRPM | DIASTOLIC BLOOD PRESSURE: 76 MMHG | OXYGEN SATURATION: 99 % | TEMPERATURE: 98.1 F | WEIGHT: 248 LBS | BODY MASS INDEX: 33.59 KG/M2 | HEIGHT: 72 IN | SYSTOLIC BLOOD PRESSURE: 120 MMHG

## 2023-01-20 DIAGNOSIS — R07.9 CHEST PAIN, UNSPECIFIED TYPE: Primary | ICD-10-CM

## 2023-01-20 DIAGNOSIS — F41.9 ANXIETY: ICD-10-CM

## 2023-01-20 DIAGNOSIS — R74.8 ELEVATED LIPASE: ICD-10-CM

## 2023-01-20 DIAGNOSIS — Z86.79 HISTORY OF HYPERTENSION: ICD-10-CM

## 2023-01-20 DIAGNOSIS — Z86.39 HISTORY OF DIABETES MELLITUS: ICD-10-CM

## 2023-01-20 LAB
ALBUMIN SERPL-MCNC: 4.1 G/DL (ref 3.5–5.2)
ALBUMIN/GLOB SERPL: 1.4 G/DL
ALP SERPL-CCNC: 54 U/L (ref 39–117)
ALT SERPL W P-5'-P-CCNC: 26 U/L (ref 1–41)
ANION GAP SERPL CALCULATED.3IONS-SCNC: 13.6 MMOL/L (ref 5–15)
AST SERPL-CCNC: 23 U/L (ref 1–40)
BASOPHILS # BLD AUTO: 0.05 10*3/MM3 (ref 0–0.2)
BASOPHILS NFR BLD AUTO: 0.8 % (ref 0–1.5)
BILIRUB SERPL-MCNC: 0.3 MG/DL (ref 0–1.2)
BILIRUB UR QL STRIP: NEGATIVE
BUN SERPL-MCNC: 11 MG/DL (ref 6–20)
BUN/CREAT SERPL: 12.5 (ref 7–25)
CALCIUM SPEC-SCNC: 9.8 MG/DL (ref 8.6–10.5)
CHLORIDE SERPL-SCNC: 104 MMOL/L (ref 98–107)
CLARITY UR: CLEAR
CO2 SERPL-SCNC: 21.4 MMOL/L (ref 22–29)
COLOR UR: YELLOW
CREAT SERPL-MCNC: 0.88 MG/DL (ref 0.76–1.27)
DEPRECATED RDW RBC AUTO: 42.5 FL (ref 37–54)
EGFRCR SERPLBLD CKD-EPI 2021: 106.7 ML/MIN/1.73
EOSINOPHIL # BLD AUTO: 0.1 10*3/MM3 (ref 0–0.4)
EOSINOPHIL NFR BLD AUTO: 1.7 % (ref 0.3–6.2)
ERYTHROCYTE [DISTWIDTH] IN BLOOD BY AUTOMATED COUNT: 13.9 % (ref 12.3–15.4)
GLOBULIN UR ELPH-MCNC: 2.9 GM/DL
GLUCOSE SERPL-MCNC: 94 MG/DL (ref 65–99)
GLUCOSE UR STRIP-MCNC: ABNORMAL MG/DL
HCT VFR BLD AUTO: 41.3 % (ref 37.5–51)
HGB BLD-MCNC: 14.1 G/DL (ref 13–17.7)
HGB UR QL STRIP.AUTO: NEGATIVE
IMM GRANULOCYTES # BLD AUTO: 0.03 10*3/MM3 (ref 0–0.05)
IMM GRANULOCYTES NFR BLD AUTO: 0.5 % (ref 0–0.5)
KETONES UR QL STRIP: ABNORMAL
LEUKOCYTE ESTERASE UR QL STRIP.AUTO: NEGATIVE
LIPASE SERPL-CCNC: 451 U/L (ref 13–60)
LYMPHOCYTES # BLD AUTO: 1.65 10*3/MM3 (ref 0.7–3.1)
LYMPHOCYTES NFR BLD AUTO: 27.3 % (ref 19.6–45.3)
MAGNESIUM SERPL-MCNC: 1.7 MG/DL (ref 1.6–2.6)
MCH RBC QN AUTO: 29 PG (ref 26.6–33)
MCHC RBC AUTO-ENTMCNC: 34.1 G/DL (ref 31.5–35.7)
MCV RBC AUTO: 84.8 FL (ref 79–97)
MONOCYTES # BLD AUTO: 0.56 10*3/MM3 (ref 0.1–0.9)
MONOCYTES NFR BLD AUTO: 9.3 % (ref 5–12)
NEUTROPHILS NFR BLD AUTO: 3.66 10*3/MM3 (ref 1.7–7)
NEUTROPHILS NFR BLD AUTO: 60.4 % (ref 42.7–76)
NITRITE UR QL STRIP: NEGATIVE
NRBC BLD AUTO-RTO: 0 /100 WBC (ref 0–0.2)
PH UR STRIP.AUTO: 8.5 [PH] (ref 5–8)
PLATELET # BLD AUTO: 257 10*3/MM3 (ref 140–450)
PMV BLD AUTO: 10.3 FL (ref 6–12)
POTASSIUM SERPL-SCNC: 3.7 MMOL/L (ref 3.5–5.2)
PROT SERPL-MCNC: 7 G/DL (ref 6–8.5)
PROT UR QL STRIP: NEGATIVE
QT INTERVAL: 390 MS
RBC # BLD AUTO: 4.87 10*6/MM3 (ref 4.14–5.8)
SODIUM SERPL-SCNC: 139 MMOL/L (ref 136–145)
SP GR UR STRIP: 1.02 (ref 1–1.03)
TROPONIN T SERPL-MCNC: <0.01 NG/ML (ref 0–0.03)
TROPONIN T SERPL-MCNC: <0.01 NG/ML (ref 0–0.03)
UROBILINOGEN UR QL STRIP: ABNORMAL
WBC NRBC COR # BLD: 6.05 10*3/MM3 (ref 3.4–10.8)

## 2023-01-20 PROCEDURE — 99284 EMERGENCY DEPT VISIT MOD MDM: CPT

## 2023-01-20 PROCEDURE — 83735 ASSAY OF MAGNESIUM: CPT | Performed by: EMERGENCY MEDICINE

## 2023-01-20 PROCEDURE — 93005 ELECTROCARDIOGRAM TRACING: CPT | Performed by: EMERGENCY MEDICINE

## 2023-01-20 PROCEDURE — 85025 COMPLETE CBC W/AUTO DIFF WBC: CPT | Performed by: EMERGENCY MEDICINE

## 2023-01-20 PROCEDURE — 71045 X-RAY EXAM CHEST 1 VIEW: CPT

## 2023-01-20 PROCEDURE — 74177 CT ABD & PELVIS W/CONTRAST: CPT

## 2023-01-20 PROCEDURE — 25010000002 IOPAMIDOL 61 % SOLUTION: Performed by: EMERGENCY MEDICINE

## 2023-01-20 PROCEDURE — 93010 ELECTROCARDIOGRAM REPORT: CPT | Performed by: INTERNAL MEDICINE

## 2023-01-20 PROCEDURE — 80053 COMPREHEN METABOLIC PANEL: CPT | Performed by: EMERGENCY MEDICINE

## 2023-01-20 PROCEDURE — 84484 ASSAY OF TROPONIN QUANT: CPT | Performed by: EMERGENCY MEDICINE

## 2023-01-20 PROCEDURE — 81003 URINALYSIS AUTO W/O SCOPE: CPT | Performed by: EMERGENCY MEDICINE

## 2023-01-20 PROCEDURE — 83690 ASSAY OF LIPASE: CPT | Performed by: EMERGENCY MEDICINE

## 2023-01-20 RX ORDER — ALPRAZOLAM 0.25 MG/1
0.5 TABLET ORAL ONCE
Status: COMPLETED | OUTPATIENT
Start: 2023-01-20 | End: 2023-01-20

## 2023-01-20 RX ADMIN — IOPAMIDOL 90 ML: 612 INJECTION, SOLUTION INTRAVENOUS at 09:36

## 2023-01-20 RX ADMIN — SODIUM CHLORIDE 1000 ML: 9 INJECTION, SOLUTION INTRAVENOUS at 09:26

## 2023-01-20 RX ADMIN — ALPRAZOLAM 0.5 MG: 0.25 TABLET ORAL at 08:40

## 2023-01-20 NOTE — DISCHARGE INSTRUCTIONS
Continue current medications, stay well-hydrated, Tylenol or ibuprofen as needed for pains, close PCP follow-up within 1 to 2 weeks, ED return for worsening symptoms as needed.

## 2023-01-20 NOTE — ED PROVIDER NOTES
EMERGENCY DEPARTMENT ENCOUNTER    Room Number:  34/34  Date of encounter:  2023  PCP: Tana Hernandez APRN  Historian: Patient      HPI:  Chief Complaint: Chest pain  A complete HPI/ROS/PMH/PSH/SH/FH are unobtainable due to: None    Context: Darvin Leonard is a 47 y.o. male who presents to the ED via EMS from work with chest pain.  Patient states his mother  in October, had been living with them all his life and he is in the process of moving out of the home, has been having increasing anxiety about that, already has a history of anxiety.  This morning had gotten up to try and work on some packing and felt like he was having a panic attack.  Has been having intermittent sharp sternal chest pain since last night.  Currently is pain-free but does report still feeling anxious.  Did receive aspirin with EMS.  Last episode of chest pain was around 7 AM.  Had some mild associated shortness of breath.  Last weekend had a couple episodes of nausea and vomiting but that has not persisted.  No fevers, chills, cough, vomiting, diarrhea currently.  He is a non-smoker, no primary family history of coronary artery disease or stroke.      MEDICAL RECORD REVIEW    External (non-ED) record review: Adult transthoracic echo 2019 shows an ejection fraction of 61 to 65%    PAST MEDICAL HISTORY  Active Ambulatory Problems     Diagnosis Date Noted   • Essential hypertension 2019   • Type 2 diabetes mellitus, without long-term current use of insulin (HCC) 2019   • Obesity (BMI 30.0-34.9) 2019   • Gastroesophageal reflux disease without esophagitis 2020   • DERIC on CPAP 10/30/2020   • Anxiety 2022     Resolved Ambulatory Problems     Diagnosis Date Noted   • Hypoglycemia 2020   • Abscess of upper lobe of right lung with pneumonia (HCC) 2020     Past Medical History:   Diagnosis Date   • Headache    • Knee pain    • Low back pain    • Neck pain          PAST SURGICAL  HISTORY  Past Surgical History:   Procedure Laterality Date   • BRONCHOSCOPY N/A 1/30/2020    Procedure: BRONCHOSCOPY WITH RIGHT UPPER LOBE BAL AND BIOPSIES;  Surgeon: Neri Aguilera MD;  Location: [facility] ENDOSCOPY;  Service: Pulmonary         FAMILY HISTORY  Family History   Problem Relation Age of Onset   • Diabetes Maternal Grandmother          SOCIAL HISTORY  Social History     Socioeconomic History   • Marital status:    Tobacco Use   • Smoking status: Never   • Smokeless tobacco: Never   Substance and Sexual Activity   • Alcohol use: Yes     Comment: socially    • Drug use: No   • Sexual activity: Defer         ALLERGIES  Patient has no known allergies.        REVIEW OF SYSTEMS  Review of Systems     All systems reviewed and negative except for those discussed in HPI.       PHYSICAL EXAM    I have reviewed the triage vital signs and nursing notes.    ED Triage Vitals   Temp Heart Rate Resp BP SpO2   01/20/23 0821 01/20/23 0816 01/20/23 0816 01/20/23 0816 01/20/23 0816   98.1 °F (36.7 °C) 76 20 (!) 151/101 100 %      Temp src Heart Rate Source Patient Position BP Location FiO2 (%)   01/20/23 0816 01/20/23 0816 -- -- --   Tympanic Monitor          Physical Exam  General: No acute distress, nontoxic, nondiaphoretic  HEENT: Mucous membranes moist, atraumatic, EOMI  Neck: Full ROM  Pulm: Symmetric chest rise, nonlabored, lungs CTAB  Cardiovascular: Regular rate and rhythm, intact distal pulses, no peripheral edema  GI: Soft, nontender, nondistended, no rebound, no guarding, bowel sounds present  MSK: Full ROM, no deformity, unable to reproduce chest pain with palpation to the chest wall  Skin: Warm, dry  Neuro: Awake, alert, oriented x 4, GCS 15, moving all extremities, no focal deficits  Psych: Calm, cooperative      N95, protective eye goggles, and gloves used during this encounter. Patient in surgical mask.      LAB RESULTS  Recent Results (from the past 24 hour(s))   Urinalysis With Microscopic If  Indicated (No Culture) - Urine, Clean Catch    Collection Time: 01/20/23  8:25 AM    Specimen: Urine, Clean Catch   Result Value Ref Range    Color, UA Yellow Yellow, Straw    Appearance, UA Clear Clear    pH, UA 8.5 (H) 5.0 - 8.0    Specific Gravity, UA 1.023 1.005 - 1.030    Glucose, UA >=1000 mg/dL (3+) (A) Negative    Ketones, UA Trace (A) Negative    Bilirubin, UA Negative Negative    Blood, UA Negative Negative    Protein, UA Negative Negative    Leuk Esterase, UA Negative Negative    Nitrite, UA Negative Negative    Urobilinogen, UA 1.0 E.U./dL 0.2 - 1.0 E.U./dL   Comprehensive Metabolic Panel    Collection Time: 01/20/23  8:30 AM    Specimen: Blood   Result Value Ref Range    Glucose 94 65 - 99 mg/dL    BUN 11 6 - 20 mg/dL    Creatinine 0.88 0.76 - 1.27 mg/dL    Sodium 139 136 - 145 mmol/L    Potassium 3.7 3.5 - 5.2 mmol/L    Chloride 104 98 - 107 mmol/L    CO2 21.4 (L) 22.0 - 29.0 mmol/L    Calcium 9.8 8.6 - 10.5 mg/dL    Total Protein 7.0 6.0 - 8.5 g/dL    Albumin 4.1 3.5 - 5.2 g/dL    ALT (SGPT) 26 1 - 41 U/L    AST (SGOT) 23 1 - 40 U/L    Alkaline Phosphatase 54 39 - 117 U/L    Total Bilirubin 0.3 0.0 - 1.2 mg/dL    Globulin 2.9 gm/dL    A/G Ratio 1.4 g/dL    BUN/Creatinine Ratio 12.5 7.0 - 25.0    Anion Gap 13.6 5.0 - 15.0 mmol/L    eGFR 106.7 >60.0 mL/min/1.73   Lipase    Collection Time: 01/20/23  8:30 AM    Specimen: Blood   Result Value Ref Range    Lipase 451 (H) 13 - 60 U/L   Troponin    Collection Time: 01/20/23  8:30 AM    Specimen: Blood   Result Value Ref Range    Troponin T <0.010 0.000 - 0.030 ng/mL   Magnesium    Collection Time: 01/20/23  8:30 AM    Specimen: Blood   Result Value Ref Range    Magnesium 1.7 1.6 - 2.6 mg/dL   CBC Auto Differential    Collection Time: 01/20/23  8:30 AM    Specimen: Blood   Result Value Ref Range    WBC 6.05 3.40 - 10.80 10*3/mm3    RBC 4.87 4.14 - 5.80 10*6/mm3    Hemoglobin 14.1 13.0 - 17.7 g/dL    Hematocrit 41.3 37.5 - 51.0 %    MCV 84.8 79.0 - 97.0 fL     MCH 29.0 26.6 - 33.0 pg    MCHC 34.1 31.5 - 35.7 g/dL    RDW 13.9 12.3 - 15.4 %    RDW-SD 42.5 37.0 - 54.0 fl    MPV 10.3 6.0 - 12.0 fL    Platelets 257 140 - 450 10*3/mm3    Neutrophil % 60.4 42.7 - 76.0 %    Lymphocyte % 27.3 19.6 - 45.3 %    Monocyte % 9.3 5.0 - 12.0 %    Eosinophil % 1.7 0.3 - 6.2 %    Basophil % 0.8 0.0 - 1.5 %    Immature Grans % 0.5 0.0 - 0.5 %    Neutrophils, Absolute 3.66 1.70 - 7.00 10*3/mm3    Lymphocytes, Absolute 1.65 0.70 - 3.10 10*3/mm3    Monocytes, Absolute 0.56 0.10 - 0.90 10*3/mm3    Eosinophils, Absolute 0.10 0.00 - 0.40 10*3/mm3    Basophils, Absolute 0.05 0.00 - 0.20 10*3/mm3    Immature Grans, Absolute 0.03 0.00 - 0.05 10*3/mm3    nRBC 0.0 0.0 - 0.2 /100 WBC   ECG 12 Lead Chest Pain    Collection Time: 01/20/23  8:30 AM   Result Value Ref Range    QT Interval 390 ms   Troponin    Collection Time: 01/20/23 10:32 AM    Specimen: Blood   Result Value Ref Range    Troponin T <0.010 0.000 - 0.030 ng/mL       Ordered the above labs and independently reviewed the results.        RADIOLOGY  CT Abdomen Pelvis With Contrast    Result Date: 1/20/2023  CT ABDOMEN PELVIS W CONTRAST-  INDICATIONS: Elevated lipase  TECHNIQUE: Radiation dose reduction techniques were utilized, including automated exposure control and exposure modulation based on body size. Enhanced ABDOMEN AND PELVIS CT  COMPARISON: Correlated with chest CT from 03/13/2020  FINDINGS:  No inflammatory changes of the pancreas are noted. No pancreatic or peripancreatic fluid collections are seen.  Relative low-density of the liver suggests steatosis.  Bilateral adrenal adenomas appear stable.  Otherwise unremarkable appearance of the liver, gallbladder, spleen, adrenal glands, pancreas, kidneys, bladder.  No bowel obstruction or abnormal bowel thickening is identified. The appendix does not appear inflamed.  No free intraperitoneal gas or free fluid.  Scattered small mesenteric and para-aortic lymph nodes are seen that are not  significant by size criteria.  Abdominal aorta is not aneurysmal. Aortic and other arterial calcifications are present.  The lung bases show minimal dependent atelectasis. A previously noted 10 mm focus of nodular infiltrate in the right lower lobe appears stable, axial image 17.  Degenerative changes are seen in the spine. No acute fracture is identified.          1. No CT findings of acute pancreatitis. 2. No acute inflammatory process of bowel is identified, follow up as indications persist. 3. No obstructive uropathy. 4. A previously noted 10 mm focus of nodular infiltrate in the right lower lobe appears  This report was finalized on 1/20/2023 10:02 AM by Dr. Sarthak Mendez M.D.      XR Chest 1 View    Result Date: 1/20/2023  CHEST SINGLE VIEW  HISTORY: Chest pain.  COMPARISON: Two-view chest 01/29/2020, chest CT 03/13/2020.  FINDINGS: Heart size is within normal limits. Lungs appear clear and there is no evidence for pulmonary edema, pleural effusion or infiltrate. Cardiac monitoring leads are noted.      No evidence for active disease in the chest.  This report was finalized on 1/20/2023 9:06 AM by Dr. Leonidas Quinn M.D.        I ordered the above noted radiological studies. Reviewed by me.  See dictation for official radiology interpretation.      PROCEDURES    Procedures  EKG    EKG Time: 0830  Rhythm/Rate: Sinus rhythm with rate of 75  Normal axis  Normal intervals  No acute ischemic changes  No STEMI     Interpreted Contemporaneously by me, independently viewed  No prior for visual comparison      MEDICATIONS GIVEN IN ER    Medications   ALPRAZolam (XANAX) tablet 0.5 mg (0.5 mg Oral Given 1/20/23 5651)   sodium chloride 0.9 % bolus 1,000 mL (0 mL Intravenous Stopped 1/20/23 6892)   iopamidol (ISOVUE-300) 61 % injection 100 mL (90 mL Intravenous Given by Other 1/20/23 0940)         PROGRESS, DATA ANALYSIS, CONSULTS, AND MEDICAL DECISION MAKING    All labs have been independently reviewed by me.  All  radiology studies have been reviewed by me and discussed with radiologist dictating the report.   EKG's independently viewed and interpreted by me.  Discussion below represents my analysis of pertinent findings related to patient's condition, differential diagnosis, treatment plan and final disposition.    Differential Diagnosis and Plan: HEART score 3, initial concern for musculoskeletal issue, anxiety, ACS, electrolyte abnormalities, arrhythmia, among others.  Lower concern for PE, aortic dissection, pneumothorax, pericarditis, among others.  Plan for labs, EKG, chest x-ray, symptomatic control, and reevaluation with results.    Additional sources:  - Discussed/ obtained information from independent historians:   None     - Chronic or social conditions impacting care: None     - Shared decision making:  Patient fully updated on and in agreement with the course and plan moving forward    ED Course as of 01/20/23 1541   Fri Jan 20, 2023   0852 WBC: 6.05 [DC]   0852 Hemoglobin: 14.1 [DC]   0852 Platelets: 257 [DC]   0852 Nitrite, UA: Negative [DC]   0852 Leukocytes, UA: Negative [DC]   0852 Ketones, UA(!): Trace [DC]   0920 Lipase(!): 451 [DC]   0957 CT Abdomen Pelvis With Contrast  My personal interpretation of the CT of the abdomen pelvis does not show any signs of bowel obstruction, no overt acute infectious process [DC]   0957 XR Chest 1 View  My personal interpretation of the chest x-ray does not show any pneumothorax [DC]   1010 CT Abdomen Pelvis With Contrast  Radiology for reviewed, no evidence of pancreatitis or other acute emergent findings [DC]   1115 Troponin T: <0.010 [DC]   1126 Patient updated on the reassuring work-up today, 2 sets of negative troponins, the elevated lipase I am unclear on but no evidence of pancreatitis by exam or imaging at this point.  May be related to some vomiting over the weekend which could be a resolving pancreatitis, unclear.  Nothing emergent at this time, he is  well-appearing with no significant pain.  Safe for discharge with outpatient PCP follow-up.  Good hydration, supportive care, ED return for worsening symptoms as needed. [DC]      ED Course User Index  [DC] Jefferson Chauhan MD       Orders Placed During This Visit:  Orders Placed This Encounter   Procedures   • XR Chest 1 View   • CT Abdomen Pelvis With Contrast   • Comprehensive Metabolic Panel   • Lipase   • Urinalysis With Microscopic If Indicated (No Culture) - Urine, Clean Catch   • Troponin   • Magnesium   • CBC Auto Differential   • Troponin   • ECG 12 Lead Chest Pain   • CBC & Differential       Additional orders considered but not placed:      Independent interpretation of labs, radiology studies, and discussions with consultants: See ED Course        AS OF 15:41 EST VITALS:    BP - 120/76  HR - 68  TEMP - 98.1 °F (36.7 °C) (Tympanic)  02 SATS - 99%        DIAGNOSIS  Final diagnoses:   Chest pain, unspecified type   Anxiety   Elevated lipase   History of hypertension   History of diabetes mellitus         DISPOSITION  DISCHARGE    Patient discharged in stable condition.    Reviewed implications of results, diagnosis, meds, responsibility to follow up, warning signs and symptoms of possible worsening, potential complications and reasons to return to ER. If your blood pressure > 120/80 please follow up with your primary care provider for further management.    Patient/Family voiced understanding of above instructions.    Discussed plan for discharge, as there is no emergent indication for admission. Pt/family is agreeable and understands need for follow up and repeat testing.  Pt is aware that discharge does not mean that nothing is wrong but it indicates no emergency is present that requires admission and they must continue care with follow-up as given below or physician of their choice.     FOLLOW-UP  [facility]  [address]  [facility]  [address] [phone]    As needed, If symptoms worsen    Tana Hernandez, APRN  [address]  [address] [address] [phone]    Schedule an appointment as soon as possible for a visit   for recheck in 1-2 weeks         Medication List      No changes were made to your prescriptions during this visit.                       --    Please note that portions of this were completed with a voice recognition program.       Note Disclaimer: At [facility], we believe that sharing information builds trust and better relationships. You are receiving this note because you are receiving care at [facility] or recently visited. It is possible you will see health information before a provider has talked with you about it. This kind of information can be easy to misunderstand. To help you fully understand what it means for your health, we urge you to discuss this note with your provider.         Jefferson Chauhan MD  01/20/23 3770

## 2023-01-23 ENCOUNTER — TELEPHONE (OUTPATIENT)
Dept: INTERNAL MEDICINE | Facility: CLINIC | Age: 48
End: 2023-01-23

## 2023-01-23 NOTE — TELEPHONE ENCOUNTER
Caller: Cyn Leonard    Relationship: Self    Best call back number:[phone]     What is the best time to reach you: ANY TIME    Who are you requesting to speak with (clinical staff, provider,  specific staff member): MS KEVIN    Do you know the name of the person who called: CYN LEONARD    What was the call regarding: PATIENT IS CALLING TO REQUEST A CALL FROM MS KEVIN.  HE STATES HE THOUGHT HE WAS HAVING A HEART ATTACH ON Friday BUT IT WAS AN ANXIETY ATTACK.  HE WANTS TO DISCUSS HIS MEDICATION.    Do you require a callback: YES.

## 2023-01-25 NOTE — PROGRESS NOTES
Subjective   Chief Complaint   Patient presents with   • Chest Pain          • Anxiety       History of Present Illness   47 y.o. male presents for ER follow up regarding chest pain. Presented via EMS 2023. Started with sharp intermittent sternal pain that started the night before. Mild dyspnea associated with it. He has had increased anxiety since the death of his mother in October and is in the process of moving out of the home they shared his entire life. He felt overwhelmed as he wasn't sure where he was going to go. Evening prior to ER visit he had N/V/D. No abdominal pain.     BP elevated on arrival at 150/101. Negative troponins. He is a non-smoker without FH CAD. Lipase was elevated--no evidence of pancreatitis. No abdominal pain. He does have DM2 and continues to struggle with obesity. He is eating better and working out again and down a pound.     He is feeling much better since since ER visit. His ex-wife helped him find a house which is close to his family. He feels as though a weight has literally been taken off his shoulders. No chest pain or dyspnea since ER visit. Denies N/V/D or abdominal pain. Feels good and feels happy for the first time since his mother  in October.     Imaging in hospital showed fatty liver disease. Up to date on Hep B vaccination. Vascular calcifications noted as well.      Patient Active Problem List   Diagnosis   • Essential hypertension   • Type 2 diabetes mellitus, without long-term current use of insulin (HCC)   • Obesity (BMI 30.0-34.9)   • Gastroesophageal reflux disease without esophagitis   • DERIC on CPAP   • Anxiety       No Known Allergies    Current Outpatient Medications on File Prior to Visit   Medication Sig Dispense Refill   • atorvastatin (LIPITOR) 10 MG tablet Take 1 tablet by mouth Daily. 90 tablet 3   • Ertugliflozin L-PyroglutamicAc (STEGLATRO) 15 MG tablet Take 1 tablet by mouth Every Morning. 30 tablet 5   • escitalopram (Lexapro) 20 MG tablet  Take 1 tablet by mouth Daily. 90 tablet 3   • glipizide (GLUCOTROL) 10 MG tablet Take 1 tablet by mouth 2 (Two) Times a Day Before Meals. 180 tablet 1   • lisinopril (PRINIVIL,ZESTRIL) 20 MG tablet Take 1 tablet by mouth every night at bedtime. 90 tablet 1   • metFORMIN (Glucophage) 1000 MG tablet Take 1 tablet by mouth 2 (Two) Times a Day With Meals. 180 tablet 1   • omeprazole (priLOSEC) 40 MG capsule Take 1 capsule by mouth Daily. 90 capsule 3   • Pennsaid 2 % solution        No current facility-administered medications on file prior to visit.       Past Medical History:   Diagnosis Date   • Abscess of upper lobe of right lung with pneumonia (HCC) 3/13/2020   • Headache    • Knee pain    • Low back pain    • Neck pain        Family History   Problem Relation Age of Onset   • Diabetes Maternal Grandmother        Social History     Socioeconomic History   • Marital status:    Tobacco Use   • Smoking status: Never   • Smokeless tobacco: Never   Substance and Sexual Activity   • Alcohol use: Yes     Comment: socially    • Drug use: No   • Sexual activity: Defer       Past Surgical History:   Procedure Laterality Date   • BRONCHOSCOPY N/A 1/30/2020    Procedure: BRONCHOSCOPY WITH RIGHT UPPER LOBE BAL AND BIOPSIES;  Surgeon: Neri Aguilera MD;  Location: [facility] ENDOSCOPY;  Service: Pulmonary       The following portions of the patient's history were reviewed and updated as appropriate: problem list, allergies, current medications, past medical history, past family history, past social history and past surgical history.    Review of Systems    Immunization History   Administered Date(s) Administered   • Flu Vaccine Quad PF >36MO 09/20/2017, 10/21/2017   • Flu Vaccine Split Quad 10/26/2018   • FluLaval/Fluzone >6mos 10/30/2020, 12/30/2022   • Hepatitis B 05/24/2019, 07/05/2019, 11/22/2019   • Hepatitis B Vaccine Adult IM 05/24/2019, 07/05/2019, 11/22/2019   • Influenza, Unspecified 12/02/2019, 12/30/2022   •  "Pneumococcal Conjugate 13-Valent (PCV13) 09/28/2018   • Pneumococcal Polysaccharide (PPSV23) 09/04/2015   • Td 09/01/2004   • Tdap 08/22/2014   • flucelvax quad pfs =>4 YRS 12/02/2019       Objective   Vitals:    01/27/23 1043   BP: 116/70   Pulse: 72   Resp: 16   Temp: 97.9 °F (36.6 °C)   TempSrc: Temporal   Weight: 112 kg (247 lb 3.2 oz)   Height: 182.9 cm (72\")     Body mass index is 33.53 kg/m².  Physical Exam  Vitals reviewed.   Constitutional:       Appearance: Normal appearance. He is well-developed. He is obese.   HENT:      Head: Normocephalic and atraumatic.   Cardiovascular:      Rate and Rhythm: Normal rate and regular rhythm.      Heart sounds: Normal heart sounds, S1 normal and S2 normal.   Pulmonary:      Effort: Pulmonary effort is normal.      Breath sounds: Normal breath sounds.   Abdominal:      General: Bowel sounds are normal. There is no distension.      Palpations: Abdomen is soft.      Tenderness: There is no abdominal tenderness.   Musculoskeletal:      Comments: Ambulates without assistance; no clubbing, cyanosis or edema.    Skin:     General: Skin is warm and dry.   Neurological:      Mental Status: He is alert.   Psychiatric:         Mood and Affect: Mood normal.         Behavior: Behavior normal.           ECG 12 Lead    Date/Time: 1/27/2023 12:10 PM  Performed by: Tana Hernandez APRN  Authorized by: Tana Hernandez APRN   Comparison: compared with previous ECG from 1/20/2023  Rhythm: sinus rhythm  Rate: normal  BPM: 72  ST Segments: ST segments normal  T Waves: T waves normal  QRS axis: normal  Other findings: early repolarization    Clinical impression: non-specific ECG            Assessment & Plan   Diagnoses and all orders for this visit:    1. Chest pain, unspecified type (Primary)  Comments:  Resolved. Attributed to anxiety in ER. Anxiety decreased and patient feeling much better. EKG today NSR with early repolarization. Understands stress echo should he " experience of symptoms prompting visit to ER.    -     ECG 12 Lead    2. Elevated lipase  Comments:  Reports he had N/V/D 01/19 that resolved the next day. No abdominal tenderness. Imaging negative for pancreatitis. Feeling great.     3. Anxiety  Comments:  He is doing much better. Continues Lexapro 20 mg daily. Discussed Buspar for intermittent use. He is willing to try it and understands directions. Call with questions.  Orders:  -     busPIRone (BUSPAR) 5 MG tablet; Take 1 tablet by mouth 3 (Three) Times a Day.  Dispense: 90 tablet; Refill: 1    4. Right lower lobe pulmonary infiltrate  Comments:  Discussed radiology, Dr. Mendez who notes stability over 3 years and feels this is post-inflammatory scarring. No additional imaging indicated.     5. Vascular calcification  Comments:  LDL 30 with Atorvastatin 10 mg daily. B-ASA daily advised.   Orders:  -     aspirin 81 MG EC tablet; Take 1 tablet by mouth Daily.    Records reviewed include ER visit 01/20/2023 including imaging and labs.     Return for Next scheduled follow up.

## 2023-01-27 ENCOUNTER — OFFICE VISIT (OUTPATIENT)
Dept: INTERNAL MEDICINE | Facility: CLINIC | Age: 48
End: 2023-01-27
Payer: COMMERCIAL

## 2023-01-27 VITALS
SYSTOLIC BLOOD PRESSURE: 116 MMHG | DIASTOLIC BLOOD PRESSURE: 70 MMHG | WEIGHT: 247.2 LBS | RESPIRATION RATE: 16 BRPM | HEART RATE: 72 BPM | HEIGHT: 72 IN | TEMPERATURE: 97.9 F | BODY MASS INDEX: 33.48 KG/M2

## 2023-01-27 DIAGNOSIS — F41.9 ANXIETY: ICD-10-CM

## 2023-01-27 DIAGNOSIS — R91.8 RIGHT LOWER LOBE PULMONARY INFILTRATE: ICD-10-CM

## 2023-01-27 DIAGNOSIS — R07.9 CHEST PAIN, UNSPECIFIED TYPE: Primary | ICD-10-CM

## 2023-01-27 DIAGNOSIS — I99.8 VASCULAR CALCIFICATION: ICD-10-CM

## 2023-01-27 DIAGNOSIS — R74.8 ELEVATED LIPASE: ICD-10-CM

## 2023-01-27 PROCEDURE — 99214 OFFICE O/P EST MOD 30 MIN: CPT | Performed by: NURSE PRACTITIONER

## 2023-01-27 PROCEDURE — 93000 ELECTROCARDIOGRAM COMPLETE: CPT | Performed by: NURSE PRACTITIONER

## 2023-01-27 RX ORDER — ASPIRIN 81 MG/1
81 TABLET ORAL DAILY
Start: 2023-01-27

## 2023-01-27 RX ORDER — BUSPIRONE HYDROCHLORIDE 5 MG/1
5 TABLET ORAL 3 TIMES DAILY
Qty: 90 TABLET | Refills: 1 | Status: SHIPPED | OUTPATIENT
Start: 2023-01-27

## 2023-01-27 RX ORDER — DICLOFENAC SODIUM 20 MG/G
SOLUTION TOPICAL
COMMUNITY
Start: 2023-01-12

## 2023-02-20 DIAGNOSIS — E11.9 TYPE 2 DIABETES MELLITUS WITHOUT COMPLICATION, WITHOUT LONG-TERM CURRENT USE OF INSULIN: Chronic | ICD-10-CM

## 2023-03-15 ENCOUNTER — TELEPHONE (OUTPATIENT)
Dept: INTERNAL MEDICINE | Facility: CLINIC | Age: 48
End: 2023-03-15
Payer: COMMERCIAL

## 2023-03-15 DIAGNOSIS — I10 ESSENTIAL HYPERTENSION: Chronic | ICD-10-CM

## 2023-03-15 DIAGNOSIS — E11.9 TYPE 2 DIABETES MELLITUS WITHOUT COMPLICATION, WITHOUT LONG-TERM CURRENT USE OF INSULIN: Chronic | ICD-10-CM

## 2023-03-15 DIAGNOSIS — F41.9 ANXIETY: ICD-10-CM

## 2023-03-15 DIAGNOSIS — K21.9 GASTROESOPHAGEAL REFLUX DISEASE WITHOUT ESOPHAGITIS: Chronic | ICD-10-CM

## 2023-03-15 RX ORDER — LISINOPRIL 20 MG/1
20 TABLET ORAL
Qty: 90 TABLET | Refills: 1 | Status: SHIPPED | OUTPATIENT
Start: 2023-03-15

## 2023-03-15 RX ORDER — OMEPRAZOLE 40 MG/1
40 CAPSULE, DELAYED RELEASE ORAL DAILY
Qty: 90 CAPSULE | Refills: 3 | Status: SHIPPED | OUTPATIENT
Start: 2023-03-15

## 2023-03-15 RX ORDER — ATORVASTATIN CALCIUM 10 MG/1
10 TABLET, FILM COATED ORAL DAILY
Qty: 90 TABLET | Refills: 3 | Status: SHIPPED | OUTPATIENT
Start: 2023-03-15

## 2023-03-15 RX ORDER — GLIPIZIDE 10 MG/1
10 TABLET ORAL
Qty: 180 TABLET | Refills: 1 | Status: SHIPPED | OUTPATIENT
Start: 2023-03-15

## 2023-03-15 RX ORDER — ESCITALOPRAM OXALATE 20 MG/1
20 TABLET ORAL DAILY
Qty: 90 TABLET | Refills: 3 | Status: SHIPPED | OUTPATIENT
Start: 2023-03-15

## 2023-03-15 NOTE — TELEPHONE ENCOUNTER
Caller: Darvin Leonard    Relationship: Self    Best call back number: [phone] (Home)    Requested Prescriptions:   atorvastatin (LIPITOR) 10 MG tablet- 90 DAY     Ertugliflozin L-PyroglutamicAc (STEGLATRO) 15 MG tablet 30 DAY    glipizide (GLUCOTROL) 10 MG tablet 90 DAY    lisinopril (PRINIVIL,ZESTRIL) 20 MG tablet  90 DAY    metFORMIN (Glucophage) 1000 MG tablet  90 DAY    omeprazole (priLOSEC) 40 MG capsule  90 DAY     escitalopram (Lexapro) 20 MG tablet  90 DAY     Pharmacy where request should be sent:  [facility] [id number] - [address] - [phone]  - [phone] FX        Additional details provided by patient: PATIENT CALLED TO REQUEST A MEDICATION REFILL ON HIS MEDICATION. PATIENT STATES THAT HE HAS A 3 DAY SUPPLY LEFT.            Does the patient have less than a 3 day supply:  [x] Yes  [] No    Would you like a call back once the refill request has been completed: [] Yes [] No    If the office needs to give you a call back, can they leave a voicemail: [] Yes [] No    Indra Sorto Rep   03/15/23 10:44 EDT         THANKS

## 2023-05-03 NOTE — PROGRESS NOTES
Subjective   Chief Complaint   Patient presents with   • Diabetes       History of Present Illness   47 y.o. male presents for follow up regarding DM2, HTN, and obesity. He has not made any changes in diet or exercise. Right knee is bone on bone making exercise difficult. Recent cortisone shot to knee. He has been out of [facility] for a month; insurance not wanting to cover--he did not call and let us know. He is drinking more over the last 9 months since the death of his mother and eating indiscriminantly.      Patient Active Problem List   Diagnosis   • Essential hypertension   • Type 2 diabetes mellitus, without long-term current use of insulin ([facility])   • Obesity (BMI 30.0-34.9)   • Gastroesophageal reflux disease without esophagitis   • DERIC on CPAP   • Anxiety   • Vascular calcification       No Known Allergies    Current Outpatient Medications on File Prior to Visit   Medication Sig Dispense Refill   • aspirin 81 MG EC tablet Take 1 tablet by mouth Daily.     • busPIRone (BUSPAR) 5 MG tablet Take 1 tablet by mouth 3 (Three) Times a Day. 90 tablet 1   • Diclofenac Sodium (VOLTAREN) 1 % gel gel      • [DISCONTINUED] atorvastatin (LIPITOR) 10 MG tablet Take 1 tablet by mouth Daily. 90 tablet 3   • [DISCONTINUED] Ertugliflozin L-PyroglutamicAc (STEGLATRO) 15 MG tablet Take 1 tablet by mouth Every Morning. 30 tablet 5   • [DISCONTINUED] escitalopram (Lexapro) 20 MG tablet Take 1 tablet by mouth Daily. 90 tablet 3   • [DISCONTINUED] glipizide (GLUCOTROL) 10 MG tablet Take 1 tablet by mouth 2 (Two) Times a Day Before Meals. 180 tablet 1   • [DISCONTINUED] lisinopril (PRINIVIL,ZESTRIL) 20 MG tablet Take 1 tablet by mouth every night at bedtime. 90 tablet 1   • [DISCONTINUED] metFORMIN (Glucophage) 1000 MG tablet Take 1 tablet by mouth 2 (Two) Times a Day With Meals. 180 tablet 1   • [DISCONTINUED] omeprazole (priLOSEC) 40 MG capsule Take 1 capsule by mouth Daily. 90 capsule 3   • [DISCONTINUED] Pennsaid 2 % solution     "    No current facility-administered medications on file prior to visit.       Past Medical History:   Diagnosis Date   • Abscess of upper lobe of right lung with pneumonia 3/13/2020   • Headache    • Knee pain    • Low back pain    • Neck pain        Family History   Problem Relation Age of Onset   • Diabetes Maternal Grandmother        Social History     Socioeconomic History   • Marital status:    Tobacco Use   • Smoking status: Never   • Smokeless tobacco: Never   Substance and Sexual Activity   • Alcohol use: Yes     Comment: socially    • Drug use: No   • Sexual activity: Defer       Past Surgical History:   Procedure Laterality Date   • BRONCHOSCOPY N/A 1/30/2020    Procedure: BRONCHOSCOPY WITH RIGHT UPPER LOBE BAL AND BIOPSIES;  Surgeon: Neri Aguilera MD;  Location: [facility] ENDOSCOPY;  Service: Pulmonary       The following portions of the patient's history were reviewed and updated as appropriate: problem list, allergies, current medications, past medical history and past social history.    Review of Systems    Immunization History   Administered Date(s) Administered   • Flu Vaccine Quad PF >36MO 09/20/2017, 10/21/2017   • Flu Vaccine Split Quad 10/26/2018   • FluLaval/Fluzone >6mos 10/30/2020, 12/30/2022   • Hepatitis B 05/24/2019, 07/05/2019, 11/22/2019   • Hepatitis B Adult/Adolescent IM 05/24/2019, 07/05/2019, 11/22/2019   • Influenza, Unspecified 12/02/2019, 12/30/2022   • Pneumococcal Conjugate 13-Valent (PCV13) 09/28/2018   • Pneumococcal Polysaccharide (PPSV23) 09/04/2015   • Td 09/01/2004   • Tdap 08/22/2014   • flucelvax quad pfs =>4 YRS 12/02/2019       Objective   Vitals:    05/05/23 0811   BP: 120/80   Pulse: 84   Resp: 14   Temp: 98 °F (36.7 °C)   Weight: 114 kg (252 lb)   Height: 182.9 cm (72.01\")     Body mass index is 34.17 kg/m².  Physical Exam  Vitals reviewed.   Constitutional:       Appearance: Normal appearance. He is well-developed. He is obese.   HENT:      Head: " Normocephalic and atraumatic.   Cardiovascular:      Rate and Rhythm: Normal rate and regular rhythm.      Heart sounds: Normal heart sounds, S1 normal and S2 normal.   Pulmonary:      Effort: Pulmonary effort is normal.      Breath sounds: Normal breath sounds.   Musculoskeletal:      Comments: Ambulates without assistance; no clubbing, cyanosis or edema.    Feet:      Right foot:      Protective Sensation: 10 sites tested. 10 sites sensed.      Skin integrity: Skin integrity normal.      Toenail Condition: Right toenails are normal.      Left foot:      Protective Sensation: 10 sites tested. 10 sites sensed.      Skin integrity: Skin integrity normal.      Toenail Condition: Left toenails are normal.   Skin:     General: Skin is warm and dry.   Neurological:      Mental Status: He is alert.   Psychiatric:         Mood and Affect: Mood normal.         Behavior: Behavior normal.         Procedures    Assessment & Plan   Diagnoses and all orders for this visit:    1. Type 2 diabetes mellitus without complication, without long-term current use of insulin (Primary)  Comments:  Not at goal--A1C9.2%. Add Ozempic as below; potential side effects reviewed. Will try to JED Morales. Foot exam today without deficits.   Orders:  -     Semaglutide,0.25 or 0.5MG/DOS, (Ozempic, 0.25 or 0.5 MG/DOSE,) 2 MG/3ML solution pen-injector; Inject 0.25 mg under the skin into the appropriate area as directed 1 (One) Time Per Week.  Dispense: 12 mL; Refill: 0  -     Semaglutide,0.25 or 0.5MG/DOS, (Ozempic, 0.25 or 0.5 MG/DOSE,) 2 MG/3ML solution pen-injector; Inject 0.5 mg under the skin into the appropriate area as directed 1 (One) Time Per Week.  Dispense: 12 mL; Refill: 0  -     Semaglutide, 1 MG/DOSE, (OZEMPIC) 4 MG/3ML solution pen-injector; Inject 1 mg under the skin into the appropriate area as directed 1 (One) Time Per Week.  Dispense: 12 mL; Refill: 0  -     Comprehensive Metabolic Panel; Future  -     Hemoglobin A1c; Future  -      Lipid Panel With / Chol / HDL Ratio; Future  -     Microalbumin / Creatinine Urine Ratio - Urine, Clean Catch; Future  -     atorvastatin (LIPITOR) 10 MG tablet; Take 1 tablet by mouth Daily.  Dispense: 90 tablet; Refill: 3  -     Ertugliflozin L-PyroglutamicAc (STEGLATRO) 15 MG tablet; Take 1 tablet by mouth Every Morning.  Dispense: 30 tablet; Refill: 5  -     glipizide (GLUCOTROL) 10 MG tablet; Take 1 tablet by mouth 2 (Two) Times a Day Before Meals.  Dispense: 180 tablet; Refill: 1  -     metFORMIN (Glucophage) 1000 MG tablet; Take 1 tablet by mouth 2 (Two) Times a Day With Meals.  Dispense: 180 tablet; Refill: 1    2. Essential hypertension  Comments:  Controlled. Continue current medications.   Orders:  -     lisinopril (PRINIVIL,ZESTRIL) 20 MG tablet; Take 1 tablet by mouth every night at bedtime.  Dispense: 90 tablet; Refill: 1    3. Obesity (BMI 30.0-34.9)  Comments:  Weight loss via dietary changes . Encouraged low impact exercise such as elliptical or bike 150 minutes weekly.     4. Anxiety  Comments:  Stable. Not needing Buspar.   Orders:  -     escitalopram (Lexapro) 20 MG tablet; Take 1 tablet by mouth Daily.  Dispense: 90 tablet; Refill: 3    5. Gastroesophageal reflux disease without esophagitis  Comments:  Controlled. Refill Omeprazole as below.   Orders:  -     omeprazole (priLOSEC) 40 MG capsule; Take 1 capsule by mouth Daily.  Dispense: 90 capsule; Refill: 3    6. Prostate cancer screening  -     PSA Screen; Future    Records reviewed include previous OV with myself as well as labs.     Return in about 3 months (around 8/5/2023) for Annual physical, Lab B4 FUP.

## 2023-05-05 ENCOUNTER — OFFICE VISIT (OUTPATIENT)
Dept: INTERNAL MEDICINE | Facility: CLINIC | Age: 48
End: 2023-05-05
Payer: COMMERCIAL

## 2023-05-05 VITALS
WEIGHT: 252 LBS | HEART RATE: 84 BPM | RESPIRATION RATE: 14 BRPM | TEMPERATURE: 98 F | HEIGHT: 72 IN | SYSTOLIC BLOOD PRESSURE: 120 MMHG | BODY MASS INDEX: 34.13 KG/M2 | DIASTOLIC BLOOD PRESSURE: 80 MMHG

## 2023-05-05 DIAGNOSIS — E66.9 OBESITY (BMI 30.0-34.9): Chronic | ICD-10-CM

## 2023-05-05 DIAGNOSIS — F41.9 ANXIETY: Chronic | ICD-10-CM

## 2023-05-05 DIAGNOSIS — E11.9 TYPE 2 DIABETES MELLITUS WITHOUT COMPLICATION, WITHOUT LONG-TERM CURRENT USE OF INSULIN: Primary | Chronic | ICD-10-CM

## 2023-05-05 DIAGNOSIS — Z12.5 PROSTATE CANCER SCREENING: ICD-10-CM

## 2023-05-05 DIAGNOSIS — K21.9 GASTROESOPHAGEAL REFLUX DISEASE WITHOUT ESOPHAGITIS: Chronic | ICD-10-CM

## 2023-05-05 DIAGNOSIS — I10 ESSENTIAL HYPERTENSION: Chronic | ICD-10-CM

## 2023-05-05 RX ORDER — SEMAGLUTIDE 0.68 MG/ML
0.25 INJECTION, SOLUTION SUBCUTANEOUS WEEKLY
Qty: 12 ML | Refills: 0 | Status: SHIPPED | OUTPATIENT
Start: 2023-05-05

## 2023-05-05 RX ORDER — GLIPIZIDE 10 MG/1
10 TABLET ORAL
Qty: 180 TABLET | Refills: 1 | Status: SHIPPED | OUTPATIENT
Start: 2023-05-05

## 2023-05-05 RX ORDER — OMEPRAZOLE 40 MG/1
40 CAPSULE, DELAYED RELEASE ORAL DAILY
Qty: 90 CAPSULE | Refills: 3 | Status: SHIPPED | OUTPATIENT
Start: 2023-05-05

## 2023-05-05 RX ORDER — SEMAGLUTIDE 0.68 MG/ML
0.5 INJECTION, SOLUTION SUBCUTANEOUS WEEKLY
Qty: 12 ML | Refills: 0 | Status: SHIPPED | OUTPATIENT
Start: 2023-05-05

## 2023-05-05 RX ORDER — ATORVASTATIN CALCIUM 10 MG/1
10 TABLET, FILM COATED ORAL DAILY
Qty: 90 TABLET | Refills: 3 | Status: SHIPPED | OUTPATIENT
Start: 2023-05-05

## 2023-05-05 RX ORDER — ESCITALOPRAM OXALATE 20 MG/1
20 TABLET ORAL DAILY
Qty: 90 TABLET | Refills: 3 | Status: SHIPPED | OUTPATIENT
Start: 2023-05-05

## 2023-05-05 RX ORDER — LISINOPRIL 20 MG/1
20 TABLET ORAL
Qty: 90 TABLET | Refills: 1 | Status: SHIPPED | OUTPATIENT
Start: 2023-05-05

## 2023-08-14 ENCOUNTER — TELEPHONE (OUTPATIENT)
Dept: INTERNAL MEDICINE | Facility: CLINIC | Age: 48
End: 2023-08-14

## 2023-08-14 NOTE — TELEPHONE ENCOUNTER
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   HE'S NOT SURE IF IT IS THE CHEMICALS OR IF IT IS SOMETHING ELSE.  THE CHEMICALS ARE PRETTY STRONG.      Is it okay if the provider responds through MyChart: NO

## 2023-08-15 ENCOUNTER — OFFICE VISIT (OUTPATIENT)
Dept: INTERNAL MEDICINE | Facility: CLINIC | Age: 48
End: 2023-08-15
Payer: COMMERCIAL

## 2023-08-15 DIAGNOSIS — J02.9 SORE THROAT: ICD-10-CM

## 2023-08-15 DIAGNOSIS — R11.10 VOMITING, UNSPECIFIED VOMITING TYPE, UNSPECIFIED WHETHER NAUSEA PRESENT: Primary | ICD-10-CM

## 2023-08-15 LAB
EXPIRATION DATE: NORMAL
EXPIRATION DATE: NORMAL
FLUAV AG UPPER RESP QL IA.RAPID: NOT DETECTED
FLUBV AG UPPER RESP QL IA.RAPID: NOT DETECTED
INTERNAL CONTROL: NORMAL
INTERNAL CONTROL: NORMAL
Lab: NORMAL
Lab: NORMAL
S PYO AG THROAT QL: NEGATIVE
SARS-COV-2 AG UPPER RESP QL IA.RAPID: NOT DETECTED

## 2023-08-15 PROCEDURE — 99214 OFFICE O/P EST MOD 30 MIN: CPT | Performed by: PHYSICIAN ASSISTANT

## 2023-08-15 PROCEDURE — 87880 STREP A ASSAY W/OPTIC: CPT | Performed by: PHYSICIAN ASSISTANT

## 2023-08-15 PROCEDURE — 87428 SARSCOV & INF VIR A&B AG IA: CPT | Performed by: PHYSICIAN ASSISTANT

## 2023-08-15 NOTE — PROGRESS NOTES
Subjective   Chief Complaint   Patient presents with    Cough    Vomiting     Exposed to chemicals on Friday when symptoms started       History of Present Illness     Pt here today with several respiratory complaints. States his tongue feels abnormal and he has difficult with taste. He reports vomiting phlegm over the weekend everytime he was upright. He is still belching, but no longer vomiting. He has had a cough for the last 4 days, it is non productive. He also has a sore throat. 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 He is concerned his sx are from the . He has used it before many times with no adverse effects. He did have a surgical mask on, did not have on gloves. No known ingestion.          Patient Active Problem List   Diagnosis    Essential hypertension    Type 2 diabetes mellitus, without long-term current use of insulin    Obesity (BMI 30.0-34.9)    Gastroesophageal reflux disease without esophagitis    DERIC on CPAP    Anxiety    Vascular calcification       No Known Allergies    Current Outpatient Medications on File Prior to Visit   Medication Sig Dispense Refill    aspirin 81 MG EC tablet Take 1 tablet by mouth Daily.      atorvastatin (LIPITOR) 10 MG tablet Take 1 tablet by mouth Daily. 90 tablet 3    busPIRone (BUSPAR) 5 MG tablet Take 1 tablet by mouth 3 (Three) Times a Day. 90 tablet 1    Diclofenac Sodium (VOLTAREN) 1 % gel gel       Ertugliflozin L-PyroglutamicAc (STEGLATRO) 15 MG tablet Take 1 tablet by mouth Every Morning. 30 tablet 5    escitalopram (Lexapro) 20 MG tablet Take 1 tablet by mouth Daily. 90 tablet 3    glipizide (GLUCOTROL) 10 MG tablet Take 1 tablet by mouth 2 (Two) Times a Day Before Meals. 180 tablet 1    lisinopril (PRINIVIL,ZESTRIL) 20 MG tablet Take 1 tablet by mouth every night at bedtime. 90 tablet 1    metFORMIN (Glucophage) 1000 MG tablet Take 1 tablet by mouth 2 (Two) Times a Day With Meals. 180 tablet 1     omeprazole (priLOSEC) 40 MG capsule Take 1 capsule by mouth Daily. 90 capsule 3    Semaglutide,0.25 or 0.5MG/DOS, (Ozempic, 0.25 or 0.5 MG/DOSE,) 2 MG/3ML solution pen-injector Inject 0.5 mg under the skin into the appropriate area as directed 1 (One) Time Per Week. 12 mL 0     No current facility-administered medications on file prior to visit.       Past Medical History:   Diagnosis Date    Abscess of upper lobe of right lung with pneumonia 3/13/2020    Headache     Knee pain     Low back pain     Neck pain        Family History   Problem Relation Age of Onset    Diabetes Maternal Grandmother        Social History     Socioeconomic History    Marital status:    Tobacco Use    Smoking status: Never    Smokeless tobacco: Never   Substance and Sexual Activity    Alcohol use: Yes     Comment: socially     Drug use: No    Sexual activity: Defer       Past Surgical History:   Procedure Laterality Date    BRONCHOSCOPY N/A 1/30/2020    Procedure: BRONCHOSCOPY WITH RIGHT UPPER LOBE BAL AND BIOPSIES;  Surgeon: Neri Aguilera MD;  Location: [facility] ENDOSCOPY;  Service: Pulmonary     The following portions of the patient's history were reviewed and updated as appropriate: problem list, allergies, current medications, past medical history, past family history, past social history, and past surgical history.    ROS    See HPI    Immunization History   Administered Date(s) Administered    Flu Vaccine Quad PF >36MO 09/20/2017, 10/21/2017    Flu Vaccine Split Quad 10/26/2018    Fluzone >6mos 10/30/2020, 12/30/2022    Hepatitis B 05/24/2019, 07/05/2019, 11/22/2019    Hepatitis B Adult/Adolescent IM 05/24/2019, 07/05/2019, 11/22/2019    Influenza, Unspecified 12/02/2019, 12/30/2022    Pneumococcal Conjugate 13-Valent (PCV13) 09/28/2018    Pneumococcal Polysaccharide (PPSV23) 09/04/2015    Td (TDVAX) 09/01/2004    Tdap 08/22/2014    flucelvax quad pfs =>4 YRS 12/02/2019       Objective   Vitals:    08/15/23 1520   Pulse: 87    Temp: 98.3 øF (36.8 øC)   SpO2: 98%     There is no height or weight on file to calculate BMI.  Physical Exam  Vitals reviewed.   Constitutional:       Appearance: He is obese.   HENT:      Head: Normocephalic and atraumatic.      Right Ear: Ear canal normal.      Left Ear: Ear canal normal.      Mouth/Throat:      Mouth: Mucous membranes are moist.      Pharynx: Posterior oropharyngeal erythema present.      Tonsils: No tonsillar exudate. 1+ on the right. 1+ on the left.   Cardiovascular:      Rate and Rhythm: Normal rate and regular rhythm.      Heart sounds: Normal heart sounds.   Pulmonary:      Effort: Pulmonary effort is normal.      Breath sounds: Normal breath sounds. No wheezing or rales.   Neurological:      Mental Status: He is alert.         Assessment & Plan   Diagnoses and all orders for this visit:    1. Vomiting, unspecified vomiting type, unspecified whether nausea present (Primary)  -     POCT SARS-CoV-2 Antigen EDINSON + Flu    2. Sore throat  -     POCT rapid strep A  -     Throat / Upper Respiratory Culture - Swab, Throat    COVID negative today, as is rapid strep. I don't think his sx are from the  as he has used it several times in the past. Likely viral URI, treat sx and use Coricidin cough syrup. Will send throat cx out as well.     No follow-ups on file.

## 2023-08-16 VITALS
TEMPERATURE: 98.3 F | OXYGEN SATURATION: 98 % | DIASTOLIC BLOOD PRESSURE: 80 MMHG | HEART RATE: 87 BPM | SYSTOLIC BLOOD PRESSURE: 132 MMHG

## 2023-08-17 LAB
BACTERIA SPEC RESP CULT: NORMAL
BACTERIA SPEC RESP CULT: NORMAL

## 2023-09-27 PROBLEM — E11.9 TYPE 2 DIABETES MELLITUS WITHOUT COMPLICATION, WITHOUT LONG-TERM CURRENT USE OF INSULIN: Chronic | Status: ACTIVE | Noted: 2023-09-27

## 2023-09-27 RX ORDER — SEMAGLUTIDE 1.34 MG/ML
INJECTION, SOLUTION SUBCUTANEOUS
COMMUNITY
Start: 2023-07-07 | End: 2023-09-29

## 2023-09-27 NOTE — PROGRESS NOTES
Subjective   Chief Complaint   Patient presents with    Annual Exam       History of Present Illness   47 y.o. male presents for annual physical. He has had increased stress at work and himself to sleep last night over it with work. Considering . Still struggles with his mother's death and trying to help his brother find a place to live. Taking Lexparo daily with great deal of improvement--at least 75%. Uses Buspar intermittently but it helps when he uses it. Drinking on the weekends only. He forgets to take Ozempic as it is once weekly. He has gained 9# since last visit. Only taking it 1-2 weeks a month. Steglatro no longer covered and no longer taking. Recent cortisone injection in right knee with Dr. Elizabeth earlier this month.     Patient Active Problem List   Diagnosis    Essential hypertension    Obesity (BMI 30.0-34.9)    Gastroesophageal reflux disease without esophagitis    DERIC on CPAP    Anxiety    Vascular calcification    Type 2 diabetes mellitus without complication, without long-term current use of insulin       No Known Allergies    Current Outpatient Medications on File Prior to Visit   Medication Sig Dispense Refill    aspirin 81 MG EC tablet Take 1 tablet by mouth Daily.      Diclofenac Sodium (VOLTAREN) 1 % gel gel Apply  topically to the appropriate area as directed As Needed.      [DISCONTINUED] atorvastatin (LIPITOR) 10 MG tablet Take 1 tablet by mouth Daily. 90 tablet 3    [DISCONTINUED] busPIRone (BUSPAR) 5 MG tablet Take 1 tablet by mouth 3 (Three) Times a Day. (Patient taking differently: Take 1 tablet by mouth As Needed.) 90 tablet 1    [DISCONTINUED] Ertugliflozin L-PyroglutamicAc (STEGLATRO) 15 MG tablet Take 1 tablet by mouth Every Morning. 30 tablet 5    [DISCONTINUED] escitalopram (Lexapro) 20 MG tablet Take 1 tablet by mouth Daily. 90 tablet 3    [DISCONTINUED] glipizide (GLUCOTROL) 10 MG tablet Take 1 tablet by mouth 2 (Two) Times a Day Before Meals. 180 tablet 1     [DISCONTINUED] lisinopril (PRINIVIL,ZESTRIL) 20 MG tablet Take 1 tablet by mouth every night at bedtime. 90 tablet 1    [DISCONTINUED] metFORMIN (Glucophage) 1000 MG tablet Take 1 tablet by mouth 2 (Two) Times a Day With Meals. 180 tablet 1    [DISCONTINUED] omeprazole (priLOSEC) 40 MG capsule Take 1 capsule by mouth Daily. 90 capsule 3    [DISCONTINUED] Ozempic, 1 MG/DOSE, 4 MG/3ML solution pen-injector        No current facility-administered medications on file prior to visit.       Past Medical History:   Diagnosis Date    Abscess of upper lobe of right lung with pneumonia 3/13/2020    Headache     Knee pain     Low back pain     Neck pain        Family History   Problem Relation Age of Onset    Diabetes Maternal Grandmother        Social History     Socioeconomic History    Marital status:    Tobacco Use    Smoking status: Never    Smokeless tobacco: Never   Substance and Sexual Activity    Alcohol use: Yes     Comment: socially     Drug use: No    Sexual activity: Defer       Past Surgical History:   Procedure Laterality Date    BRONCHOSCOPY N/A 1/30/2020    Procedure: BRONCHOSCOPY WITH RIGHT UPPER LOBE BAL AND BIOPSIES;  Surgeon: Neri Aguilera MD;  Location: [facility] ENDOSCOPY;  Service: Pulmonary       The following portions of the patient's history were reviewed and updated as appropriate: problem list, allergies, current medications, past medical history, past family history, past social history, and past surgical history.    Review of Systems    Immunization History   Administered Date(s) Administered    Flu Vaccine Quad PF >36MO 09/20/2017, 10/21/2017    Flu Vaccine Split Quad 10/26/2018    Fluzone (or Fluarix & Flulaval for VFC) >6mos 10/30/2020, 12/30/2022, 09/29/2023    Hepatitis B 05/24/2019, 07/05/2019, 11/22/2019    Hepatitis B Adult/Adolescent IM 05/24/2019, 07/05/2019, 11/22/2019    Influenza, Unspecified 12/02/2019, 12/30/2022    Pneumococcal Conjugate 13-Valent (PCV13) 09/28/2018     "Pneumococcal Polysaccharide (PPSV23) 09/04/2015    Td (TDVAX) 09/01/2004    Tdap 08/22/2014    flucelvax quad pfs =>4 YRS 12/02/2019       Objective   Vitals:    09/29/23 1207   BP: 110/76   Pulse: 72   Resp: 14   Weight: 119 kg (261 lb 12.8 oz)   Height: 182.9 cm (72.01\")     Body mass index is 35.5 kg/m².  Physical Exam  Vitals reviewed.   Constitutional:       Appearance: Normal appearance. He is well-developed. He is obese.   HENT:      Head: Normocephalic and atraumatic.      Right Ear: Tympanic membrane, ear canal and external ear normal.      Left Ear: Tympanic membrane, ear canal and external ear normal.      Nose: Nose normal.      Mouth/Throat:      Mouth: Mucous membranes are moist.      Pharynx: Oropharynx is clear. No oropharyngeal exudate or posterior oropharyngeal erythema.   Eyes:      General: No scleral icterus.     Extraocular Movements: Extraocular movements intact.      Conjunctiva/sclera: Conjunctivae normal.      Pupils: Pupils are equal, round, and reactive to light.   Neck:      Thyroid: No thyromegaly.      Vascular: No carotid bruit.   Cardiovascular:      Rate and Rhythm: Normal rate and regular rhythm.      Heart sounds: Normal heart sounds. No murmur heard.  Pulmonary:      Effort: Pulmonary effort is normal.      Breath sounds: Normal breath sounds.   Abdominal:      General: Bowel sounds are normal. There is no distension.      Palpations: Abdomen is soft.      Tenderness: There is no abdominal tenderness.   Genitourinary:     Prostate: Normal.      Rectum: Normal.   Musculoskeletal:      Cervical back: Neck supple.      Comments: Ambulates without assistance; no clubbing, cyanosis or edema.    Lymphadenopathy:      Cervical: No cervical adenopathy.   Skin:     General: Skin is warm and dry.   Neurological:      Mental Status: He is alert.   Psychiatric:         Mood and Affect: Mood normal.         Behavior: Behavior normal.       Procedures    Assessment & Plan   Diagnoses and all " orders for this visit:    1. Annual physical exam (Primary)  Comments:  Weight loss via dieary changes and 150 minutes wekly aerobic exercise advised. Flu shot today.    2. Type 2 diabetes mellitus without complication, without long-term current use of insulin  Comments:  No controlled with A1C 10%. Increase Ozempic to 2 mg weekly and set reminders to remeber to take it. Eye exam due in November. Flu shot today. If no improvement, will schedule with endo.   Orders:  -     Basic Metabolic Panel; Future  -     Hemoglobin A1c; Future  -     atorvastatin (LIPITOR) 10 MG tablet; Take 1 tablet by mouth Daily.  Dispense: 90 tablet; Refill: 3  -     glipizide (GLUCOTROL) 10 MG tablet; Take 1 tablet by mouth 2 (Two) Times a Day Before Meals.  Dispense: 180 tablet; Refill: 1  -     metFORMIN (Glucophage) 1000 MG tablet; Take 1 tablet by mouth 2 (Two) Times a Day With Meals.  Dispense: 180 tablet; Refill: 1  -     Semaglutide, 2 MG/DOSE, (Ozempic, 2 MG/DOSE,) 8 MG/3ML solution pen-injector; Inject 2 mg under the skin into the appropriate area as directed 1 (One) Time Per Week.  Dispense: 3 mL; Refill: 6    3. Essential hypertension  Comments:  Controlled. Continue Lisinopril 10 mg daily.  Orders:  -     lisinopril (PRINIVIL,ZESTRIL) 20 MG tablet; Take 1 tablet by mouth every night at bedtime.  Dispense: 90 tablet; Refill: 1    4. Obesity (BMI 30.0-34.9)  Comments:  Weight loss via dietary changes and 150 minutes weekly aerobic execise advised.    5. Anxiety  Comments:  Inproved by greater than 75% with intermittent use of Buspar. Discussed counselor/therapist to help with work stress and loss of his mother. He will consider.  Orders:  -     escitalopram (Lexapro) 20 MG tablet; Take 1 tablet by mouth Daily.  Dispense: 90 tablet; Refill: 3  -     busPIRone (BUSPAR) 5 MG tablet; Take 1 tablet by mouth 3 (Three) Times a Day. Prn  Dispense: 90 tablet; Refill: 3    6. Need for influenza vaccination  -     Fluzone (or Fluarix &  Flulaval for VFC) >6mos    7. Vascular calcification  Comments:  Continue Atorvastatin and B-ASA daily.  Orders:  -     atorvastatin (LIPITOR) 10 MG tablet; Take 1 tablet by mouth Daily.  Dispense: 90 tablet; Refill: 3    8. Gastroesophageal reflux disease without esophagitis  Comments:  Controlled. Refill Omeprazole as below.   Orders:  -     omeprazole (priLOSEC) 40 MG capsule; Take 1 capsule by mouth Daily.  Dispense: 90 capsule; Refill: 3    Records reviewed include previous OV with myself as well as labs.     Return in about 3 months (around 12/29/2023) for 30 min DM, obesity.

## 2023-09-29 ENCOUNTER — OFFICE VISIT (OUTPATIENT)
Dept: INTERNAL MEDICINE | Facility: CLINIC | Age: 48
End: 2023-09-29
Payer: COMMERCIAL

## 2023-09-29 VITALS
BODY MASS INDEX: 35.46 KG/M2 | DIASTOLIC BLOOD PRESSURE: 76 MMHG | RESPIRATION RATE: 14 BRPM | WEIGHT: 261.8 LBS | HEART RATE: 72 BPM | HEIGHT: 72 IN | SYSTOLIC BLOOD PRESSURE: 110 MMHG

## 2023-09-29 DIAGNOSIS — K21.9 GASTROESOPHAGEAL REFLUX DISEASE WITHOUT ESOPHAGITIS: Chronic | ICD-10-CM

## 2023-09-29 DIAGNOSIS — E66.9 OBESITY (BMI 30.0-34.9): Chronic | ICD-10-CM

## 2023-09-29 DIAGNOSIS — I99.8 VASCULAR CALCIFICATION: ICD-10-CM

## 2023-09-29 DIAGNOSIS — Z00.00 ANNUAL PHYSICAL EXAM: Primary | ICD-10-CM

## 2023-09-29 DIAGNOSIS — E11.9 TYPE 2 DIABETES MELLITUS WITHOUT COMPLICATION, WITHOUT LONG-TERM CURRENT USE OF INSULIN: Chronic | ICD-10-CM

## 2023-09-29 DIAGNOSIS — I10 ESSENTIAL HYPERTENSION: Chronic | ICD-10-CM

## 2023-09-29 DIAGNOSIS — F41.9 ANXIETY: Chronic | ICD-10-CM

## 2023-09-29 DIAGNOSIS — Z23 NEED FOR INFLUENZA VACCINATION: ICD-10-CM

## 2023-09-29 RX ORDER — ESCITALOPRAM OXALATE 20 MG/1
20 TABLET ORAL DAILY
Qty: 90 TABLET | Refills: 3 | Status: SHIPPED | OUTPATIENT
Start: 2023-09-29

## 2023-09-29 RX ORDER — ATORVASTATIN CALCIUM 10 MG/1
10 TABLET, FILM COATED ORAL DAILY
Qty: 90 TABLET | Refills: 3 | Status: SHIPPED | OUTPATIENT
Start: 2023-09-29

## 2023-09-29 RX ORDER — LISINOPRIL 20 MG/1
20 TABLET ORAL
Qty: 90 TABLET | Refills: 1 | Status: SHIPPED | OUTPATIENT
Start: 2023-09-29

## 2023-09-29 RX ORDER — BUSPIRONE HYDROCHLORIDE 5 MG/1
5 TABLET ORAL 3 TIMES DAILY
Qty: 90 TABLET | Refills: 3 | Status: SHIPPED | OUTPATIENT
Start: 2023-09-29

## 2023-09-29 RX ORDER — OMEPRAZOLE 40 MG/1
40 CAPSULE, DELAYED RELEASE ORAL DAILY
Qty: 90 CAPSULE | Refills: 3 | Status: SHIPPED | OUTPATIENT
Start: 2023-09-29

## 2023-09-29 RX ORDER — SEMAGLUTIDE 2.68 MG/ML
2 INJECTION, SOLUTION SUBCUTANEOUS WEEKLY
Qty: 3 ML | Refills: 6 | Status: SHIPPED | OUTPATIENT
Start: 2023-09-29

## 2023-09-29 RX ORDER — GLIPIZIDE 10 MG/1
10 TABLET ORAL
Qty: 180 TABLET | Refills: 1 | Status: SHIPPED | OUTPATIENT
Start: 2023-09-29

## 2023-10-07 DIAGNOSIS — E11.9 TYPE 2 DIABETES MELLITUS WITHOUT COMPLICATION, WITHOUT LONG-TERM CURRENT USE OF INSULIN: Chronic | ICD-10-CM

## 2023-10-09 RX ORDER — SEMAGLUTIDE 1.34 MG/ML
INJECTION, SOLUTION SUBCUTANEOUS
Qty: 9 ML | OUTPATIENT
Start: 2023-10-09

## 2023-10-31 ENCOUNTER — TELEPHONE (OUTPATIENT)
Dept: INTERNAL MEDICINE | Facility: CLINIC | Age: 48
End: 2023-10-31

## 2023-10-31 DIAGNOSIS — E11.649 UNCONTROLLED TYPE 2 DIABETES MELLITUS WITH HYPOGLYCEMIA WITHOUT COMA: Primary | ICD-10-CM

## 2023-10-31 RX ORDER — TIRZEPATIDE 2.5 MG/.5ML
2.5 INJECTION, SOLUTION SUBCUTANEOUS WEEKLY
Qty: 2 ML | Refills: 0 | Status: SHIPPED | OUTPATIENT
Start: 2023-10-31

## 2023-10-31 NOTE — TELEPHONE ENCOUNTER
We will try Mounjaro. It is a once weekly injection. He will have to call every 4 weeks so I can titrate it up. Potential side effects are the same as Ozempic.

## 2023-10-31 NOTE — TELEPHONE ENCOUNTER
Caller: Darvin Leonard    Relationship: Self    Best call back number:[phone]     What medication are you requesting: PATIENT HAS NOT BEEN ABLE TO USE THE NEW INJECTABLE FOR DIABETES BECAUSE IT IS BACKORDERED. HE DOES NOT KNOW THE NAME. [facility] TOLD PATIENT TO CALL PROVIDER AND HAVE IT CHANGED BUT DID NOT OFFER ANY SUBSTITUTIONS.     PATIENT HAS NOT HAD ANY MEDICATION FOR WEEKS NOW.     PLEASE CALL PATIENT    Have you had these symptoms before:    [x] Yes  [] No    Have you been treated for these symptoms before:   [x] Yes  [] No    If a prescription is needed, what is your preferred pharmacy and phone number: [facility] [id number] - [address] - [phone]  - [phone] FX

## 2023-11-08 ENCOUNTER — HOSPITAL ENCOUNTER (EMERGENCY)
Facility: HOSPITAL | Age: 48
Discharge: HOME | End: 2023-11-08
Attending: EMERGENCY MEDICINE | Admitting: EMERGENCY MEDICINE
Payer: OTHER MISCELLANEOUS

## 2023-11-08 ENCOUNTER — APPOINTMENT (OUTPATIENT)
Dept: CT IMAGING | Facility: HOSPITAL | Age: 48
End: 2023-11-08
Payer: OTHER MISCELLANEOUS

## 2023-11-08 VITALS
RESPIRATION RATE: 18 BRPM | BODY MASS INDEX: 33.72 KG/M2 | OXYGEN SATURATION: 99 % | SYSTOLIC BLOOD PRESSURE: 138 MMHG | HEART RATE: 105 BPM | TEMPERATURE: 97.7 F | HEIGHT: 72 IN | DIASTOLIC BLOOD PRESSURE: 80 MMHG | WEIGHT: 249 LBS

## 2023-11-08 DIAGNOSIS — S39.011A STRAIN OF ABDOMINAL MUSCLE, INITIAL ENCOUNTER: Primary | ICD-10-CM
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
## 2023-11-08 LAB
ALBUMIN SERPL-MCNC: 4 G/DL (ref 3.5–5.2)
ALBUMIN/GLOB SERPL: 2.1 G/DL
ALP SERPL-CCNC: 56 U/L (ref 39–117)
ALT SERPL W P-5'-P-CCNC: 38 U/L (ref 1–41)
ANION GAP SERPL CALCULATED.3IONS-SCNC: 10 MMOL/L (ref 5–15)
AST SERPL-CCNC: 19 U/L (ref 1–40)
BASOPHILS # BLD AUTO: 0.04 10*3/MM3 (ref 0–0.2)
BASOPHILS NFR BLD AUTO: 0.5 % (ref 0–1.5)
BILIRUB SERPL-MCNC: 0.2 MG/DL (ref 0–1.2)
BILIRUB UR QL STRIP: NEGATIVE
BUN SERPL-MCNC: 11 MG/DL (ref 6–20)
BUN/CREAT SERPL: 12.6 (ref 7–25)
CALCIUM SPEC-SCNC: 9.2 MG/DL (ref 8.6–10.5)
CHLORIDE SERPL-SCNC: 98 MMOL/L (ref 98–107)
CLARITY UR: CLEAR
CO2 SERPL-SCNC: 27 MMOL/L (ref 22–29)
COLOR UR: YELLOW
CREAT SERPL-MCNC: 0.87 MG/DL (ref 0.76–1.27)
DEPRECATED RDW RBC AUTO: 44 FL (ref 37–54)
EGFRCR SERPLBLD CKD-EPI 2021: 107.1 ML/MIN/1.73
EOSINOPHIL # BLD AUTO: 0.22 10*3/MM3 (ref 0–0.4)
EOSINOPHIL NFR BLD AUTO: 2.7 % (ref 0.3–6.2)
ERYTHROCYTE [DISTWIDTH] IN BLOOD BY AUTOMATED COUNT: 13.6 % (ref 12.3–15.4)
GLOBULIN UR ELPH-MCNC: 1.9 GM/DL
GLUCOSE SERPL-MCNC: 350 MG/DL (ref 65–99)
GLUCOSE UR STRIP-MCNC: ABNORMAL MG/DL
HCT VFR BLD AUTO: 36.5 % (ref 37.5–51)
HGB BLD-MCNC: 11.9 G/DL (ref 13–17.7)
HGB UR QL STRIP.AUTO: NEGATIVE
HOLD SPECIMEN: NORMAL
HOLD SPECIMEN: NORMAL
IMM GRANULOCYTES # BLD AUTO: 0.08 10*3/MM3 (ref 0–0.05)
IMM GRANULOCYTES NFR BLD AUTO: 1 % (ref 0–0.5)
KETONES UR QL STRIP: NEGATIVE
LEUKOCYTE ESTERASE UR QL STRIP.AUTO: NEGATIVE
LIPASE SERPL-CCNC: 100 U/L (ref 13–60)
LYMPHOCYTES # BLD AUTO: 1.75 10*3/MM3 (ref 0.7–3.1)
LYMPHOCYTES NFR BLD AUTO: 21.3 % (ref 19.6–45.3)
MCH RBC QN AUTO: 28.9 PG (ref 26.6–33)
MCHC RBC AUTO-ENTMCNC: 32.6 G/DL (ref 31.5–35.7)
MCV RBC AUTO: 88.6 FL (ref 79–97)
MONOCYTES # BLD AUTO: 0.61 10*3/MM3 (ref 0.1–0.9)
MONOCYTES NFR BLD AUTO: 7.4 % (ref 5–12)
NEUTROPHILS NFR BLD AUTO: 5.5 10*3/MM3 (ref 1.7–7)
NEUTROPHILS NFR BLD AUTO: 67.1 % (ref 42.7–76)
NITRITE UR QL STRIP: NEGATIVE
NRBC BLD AUTO-RTO: 0 /100 WBC (ref 0–0.2)
PH UR STRIP.AUTO: 6 [PH] (ref 5–8)
PLATELET # BLD AUTO: 185 10*3/MM3 (ref 140–450)
PMV BLD AUTO: 11.3 FL (ref 6–12)
POTASSIUM SERPL-SCNC: 3.6 MMOL/L (ref 3.5–5.2)
PROT SERPL-MCNC: 5.9 G/DL (ref 6–8.5)
PROT UR QL STRIP: NEGATIVE
RBC # BLD AUTO: 4.12 10*6/MM3 (ref 4.14–5.8)
SODIUM SERPL-SCNC: 135 MMOL/L (ref 136–145)
SP GR UR STRIP: 1.03 (ref 1–1.03)
UROBILINOGEN UR QL STRIP: ABNORMAL
WBC NRBC COR # BLD: 8.2 10*3/MM3 (ref 3.4–10.8)
WHOLE BLOOD HOLD COAG: NORMAL
WHOLE BLOOD HOLD SPECIMEN: NORMAL

## 2023-11-08 PROCEDURE — 96374 THER/PROPH/DIAG INJ IV PUSH: CPT

## 2023-11-08 PROCEDURE — 25510000001 IOPAMIDOL 61 % SOLUTION: Performed by: EMERGENCY MEDICINE

## 2023-11-08 PROCEDURE — 83690 ASSAY OF LIPASE: CPT | Performed by: NURSE PRACTITIONER

## 2023-11-08 PROCEDURE — 99285 EMERGENCY DEPT VISIT HI MDM: CPT

## 2023-11-08 PROCEDURE — 25010000002 ONDANSETRON PER 1 MG: Performed by: NURSE PRACTITIONER

## 2023-11-08 PROCEDURE — 25010000002 MORPHINE PER 10 MG: Performed by: NURSE PRACTITIONER

## 2023-11-08 PROCEDURE — 85025 COMPLETE CBC W/AUTO DIFF WBC: CPT | Performed by: NURSE PRACTITIONER

## 2023-11-08 PROCEDURE — 96375 TX/PRO/DX INJ NEW DRUG ADDON: CPT

## 2023-11-08 PROCEDURE — 74177 CT ABD & PELVIS W/CONTRAST: CPT

## 2023-11-08 PROCEDURE — 81003 URINALYSIS AUTO W/O SCOPE: CPT | Performed by: NURSE PRACTITIONER

## 2023-11-08 PROCEDURE — 80053 COMPREHEN METABOLIC PANEL: CPT | Performed by: NURSE PRACTITIONER

## 2023-11-08 RX ORDER — METHOCARBAMOL 750 MG/1
750 TABLET, FILM COATED ORAL ONCE
Status: COMPLETED | OUTPATIENT
Start: 2023-11-08 | End: 2023-11-08

## 2023-11-08 RX ORDER — LIDOCAINE 4 G/G
1 PATCH TOPICAL
Status: DISCONTINUED | OUTPATIENT
Start: 2023-11-08 | End: 2023-11-08 | Stop reason: HOSPADM

## 2023-11-08 RX ORDER — NAPROXEN 500 MG/1
500 TABLET ORAL 2 TIMES DAILY PRN
Qty: 20 TABLET | Refills: 0 | Status: SHIPPED | OUTPATIENT
Start: 2023-11-08

## 2023-11-08 RX ORDER — METHOCARBAMOL 750 MG/1
750 TABLET, FILM COATED ORAL 3 TIMES DAILY PRN
Qty: 12 TABLET | Refills: 0 | Status: SHIPPED | OUTPATIENT
Start: 2023-11-08

## 2023-11-08 RX ORDER — MORPHINE SULFATE 2 MG/ML
4 INJECTION, SOLUTION INTRAMUSCULAR; INTRAVENOUS ONCE
Status: COMPLETED | OUTPATIENT
Start: 2023-11-08 | End: 2023-11-08

## 2023-11-08 RX ORDER — ONDANSETRON 2 MG/ML
4 INJECTION INTRAMUSCULAR; INTRAVENOUS ONCE
Status: COMPLETED | OUTPATIENT
Start: 2023-11-08 | End: 2023-11-08

## 2023-11-08 RX ORDER — LIDOCAINE 50 MG/G
1 PATCH TOPICAL EVERY 24 HOURS
Qty: 6 EACH | Refills: 0 | Status: SHIPPED | OUTPATIENT
Start: 2023-11-08

## 2023-11-08 RX ORDER — SODIUM CHLORIDE 0.9 % (FLUSH) 0.9 %
10 SYRINGE (ML) INJECTION AS NEEDED
Status: DISCONTINUED | OUTPATIENT
Start: 2023-11-08 | End: 2023-11-08 | Stop reason: HOSPADM

## 2023-11-08 RX ADMIN — MORPHINE SULFATE 4 MG: 2 INJECTION, SOLUTION INTRAMUSCULAR; INTRAVENOUS at 15:38

## 2023-11-08 RX ADMIN — ONDANSETRON 4 MG: 2 INJECTION INTRAMUSCULAR; INTRAVENOUS at 15:36

## 2023-11-08 RX ADMIN — METHOCARBAMOL TABLETS 750 MG: 750 TABLET, COATED ORAL at 17:18

## 2023-11-08 RX ADMIN — LIDOCAINE 1 PATCH: 4 PATCH TOPICAL at 17:19

## 2023-11-08 RX ADMIN — IOPAMIDOL 85 ML: 612 INJECTION, SOLUTION INTRAVENOUS at 17:59

## 2023-11-08 NOTE — Clinical Note
[facility] EMERGENCY DEPARTMENT  [address] [facility] [address]  Phone: [phone]    Darvin Leonard was seen and treated in our emergency department on 11/8/2023.  He may return to work on 11/10/2023.         Thank you for choosing [facility].    Jennifer Munoz APRN

## 2023-11-08 NOTE — ED PROVIDER NOTES
EMERGENCY DEPARTMENT ENCOUNTER    Room Number:  T01/01  Date seen:  11/8/2023  PCP: Tana Hernandez APRN  Historian/Independent [facility]  Chronic or social conditions impacting care: n/a      HPI:  Chief Complaint: abdominal pain  A complete HPI/ROS/PMH/PSH/SH/FH are unobtainable due to: n/a  Context: Darvin Leonard is a 47 y.o. male who presents to the ED c/o Liang pain.  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  The pain does not radiate down his leg.  He has no back pain.  No loss of bowel or bladder control, saddle anesthesia, weakness, numbness, tingling.  He denies any previous abdominal surgeries.  No other complaints or injuries at this time. He was seen at a [facility] clinic who sent him to ER for evaluation because they did not have CT capabilities. Per report from clinic, he was given 60mg IM Toradol.     External Medical record review:   9/29/2023: Annual physical exam with follow-up in 3 months      PAST MEDICAL HISTORY  Active Ambulatory Problems     Diagnosis Date Noted    Essential hypertension 05/23/2019    Obesity (BMI 30.0-34.9) 05/24/2019    Gastroesophageal reflux disease without esophagitis 03/13/2020    DERIC on CPAP 10/30/2020    Anxiety 12/30/2022    Vascular calcification 01/27/2023    Type 2 diabetes mellitus without complication, without long-term current use of insulin 09/27/2023     Resolved Ambulatory Problems     Diagnosis Date Noted    Hypoglycemia 01/29/2020    Abscess of upper lobe of right lung with pneumonia 03/13/2020     Past Medical History:   Diagnosis Date    Headache     Knee pain     Low back pain     Neck pain          PAST SURGICAL HISTORY  Past Surgical History:   Procedure Laterality Date    BRONCHOSCOPY N/A 1/30/2020    Procedure: BRONCHOSCOPY WITH RIGHT UPPER LOBE BAL AND BIOPSIES;  Surgeon:  Neri Aguilera MD;  Location: [facility] ENDOSCOPY;  Service: Pulmonary         FAMILY HISTORY  Family History   Problem Relation Age of Onset    Diabetes Maternal Grandmother          SOCIAL HISTORY  Social History     Socioeconomic History    Marital status:    Tobacco Use    Smoking status: Never    Smokeless tobacco: Never   Substance and Sexual Activity    Alcohol use: Yes     Comment: socially     Drug use: No    Sexual activity: Defer         ALLERGIES  Patient has no known allergies.        REVIEW OF SYSTEMS  Per HPI, otherwise negative.       PHYSICAL EXAM  ED Triage Vitals   Temp Heart Rate Resp BP SpO2   11/08/23 1449 11/08/23 1449 11/08/23 1449 11/08/23 1452 11/08/23 1449   97.7 °F (36.5 °C) 107 18 138/80 99 %      Temp src Heart Rate Source Patient Position BP Location FiO2 (%)   11/08/23 1449 11/08/23 1449 11/08/23 1452 11/08/23 1452 --   Tympanic Monitor Sitting Left arm        Physical Exam  Vitals and nursing note reviewed.   Constitutional:       Appearance: Normal appearance. He is obese.   Eyes:      Conjunctiva/sclera: Conjunctivae normal.   Cardiovascular:      Rate and Rhythm: Normal rate and regular rhythm.      Pulses: Normal pulses.      Heart sounds: Normal heart sounds.   Pulmonary:      Effort: Pulmonary effort is normal.      Breath sounds: Normal breath sounds. No wheezing.   Abdominal:      General: Bowel sounds are normal.      Palpations: Abdomen is soft.      Tenderness: There is abdominal tenderness (diffuse, right-sided). There is no right CVA tenderness, left CVA tenderness, guarding or rebound.      Hernia: No hernia is present.   Musculoskeletal:         General: Normal range of motion.      Comments: No midline cervical spine tenderness. No obvious step-offs or deformities. Strength 5/5 equal to BUE. Sensation intact to light touch. FROM.    No midline thoracic spine tenderness. No obvious step-off or deformities.     No lumbar spine tenderness. Negative straight-leg  exam bilaterally. Strength 5/5 and equal to BLE. Sensation intact to light touch. FROM.     Neurological:      Mental Status: He is alert and oriented to person, place, and time. Mental status is at baseline.   Psychiatric:         Mood and Affect: Mood normal.               LAB RESULTS  Recent Results (from the past 24 hour(s))   Comprehensive Metabolic Panel    Collection Time: 11/08/23  3:21 PM    Specimen: Blood   Result Value Ref Range    Glucose 350 (H) 65 - 99 mg/dL    BUN 11 6 - 20 mg/dL    Creatinine 0.87 0.76 - 1.27 mg/dL    Sodium 135 (L) 136 - 145 mmol/L    Potassium 3.6 3.5 - 5.2 mmol/L    Chloride 98 98 - 107 mmol/L    CO2 27.0 22.0 - 29.0 mmol/L    Calcium 9.2 8.6 - 10.5 mg/dL    Total Protein 5.9 (L) 6.0 - 8.5 g/dL    Albumin 4.0 3.5 - 5.2 g/dL    ALT (SGPT) 38 1 - 41 U/L    AST (SGOT) 19 1 - 40 U/L    Alkaline Phosphatase 56 39 - 117 U/L    Total Bilirubin 0.2 0.0 - 1.2 mg/dL    Globulin 1.9 gm/dL    A/G Ratio 2.1 g/dL    BUN/Creatinine Ratio 12.6 7.0 - 25.0    Anion Gap 10.0 5.0 - 15.0 mmol/L    eGFR 107.1 >60.0 mL/min/1.73   Lipase    Collection Time: 11/08/23  3:21 PM    Specimen: Blood   Result Value Ref Range    Lipase 100 (H) 13 - 60 U/L   Green Top (Gel)    Collection Time: 11/08/23  3:21 PM   Result Value Ref Range    Extra Tube Hold for add-ons.    Lavender Top    Collection Time: 11/08/23  3:21 PM   Result Value Ref Range    Extra Tube hold for add-on    Gold Top - SST    Collection Time: 11/08/23  3:21 PM   Result Value Ref Range    Extra Tube Hold for add-ons.    Light Blue Top    Collection Time: 11/08/23  3:21 PM   Result Value Ref Range    Extra Tube Hold for add-ons.    CBC Auto Differential    Collection Time: 11/08/23  3:21 PM    Specimen: Blood   Result Value Ref Range    WBC 8.20 3.40 - 10.80 10*3/mm3    RBC 4.12 (L) 4.14 - 5.80 10*6/mm3    Hemoglobin 11.9 (L) 13.0 - 17.7 g/dL    Hematocrit 36.5 (L) 37.5 - 51.0 %    MCV 88.6 79.0 - 97.0 fL    MCH 28.9 26.6 - 33.0 pg    MCHC 32.6  31.5 - 35.7 g/dL    RDW 13.6 12.3 - 15.4 %    RDW-SD 44.0 37.0 - 54.0 fl    MPV 11.3 6.0 - 12.0 fL    Platelets 185 140 - 450 10*3/mm3    Neutrophil % 67.1 42.7 - 76.0 %    Lymphocyte % 21.3 19.6 - 45.3 %    Monocyte % 7.4 5.0 - 12.0 %    Eosinophil % 2.7 0.3 - 6.2 %    Basophil % 0.5 0.0 - 1.5 %    Immature Grans % 1.0 (H) 0.0 - 0.5 %    Neutrophils, Absolute 5.50 1.70 - 7.00 10*3/mm3    Lymphocytes, Absolute 1.75 0.70 - 3.10 10*3/mm3    Monocytes, Absolute 0.61 0.10 - 0.90 10*3/mm3    Eosinophils, Absolute 0.22 0.00 - 0.40 10*3/mm3    Basophils, Absolute 0.04 0.00 - 0.20 10*3/mm3    Immature Grans, Absolute 0.08 (H) 0.00 - 0.05 10*3/mm3    nRBC 0.0 0.0 - 0.2 /100 WBC   Urinalysis With Microscopic If Indicated (No Culture) - Urine, Clean Catch    Collection Time: 11/08/23  3:29 PM    Specimen: Urine, Clean Catch   Result Value Ref Range    Color, UA Yellow Yellow, Straw    Appearance, UA Clear Clear    pH, UA 6.0 5.0 - 8.0    Specific Gravity, UA 1.027 1.005 - 1.030    Glucose, UA >=1000 mg/dL (3+) (A) Negative    Ketones, UA Negative Negative    Bilirubin, UA Negative Negative    Blood, UA Negative Negative    Protein, UA Negative Negative    Leuk Esterase, UA Negative Negative    Nitrite, UA Negative Negative    Urobilinogen, UA 0.2 E.U./dL 0.2 - 1.0 E.U./dL       Ordered the above labs and reviewed the results.        RADIOLOGY  CT Abdomen Pelvis With Contrast    Result Date: 11/8/2023  CT ABDOMEN AND PELVIS WITH IV CONTRAST  HISTORY: Right flank and right abdominal pain  TECHNIQUE: Radiation dose reduction techniques were utilized, including automated exposure control and exposure modulation based on body size. Axial images were obtained through the abdomen and pelvis after the administration of IV contrast. Coronal and sagittal reformatted images obtained.  COMPARISON: 1/20/2023  FINDINGS:  ABDOMEN: Stable focal nodularity in the right lung base. Fatty infiltration of the liver. The gallbladder and spleen are  unremarkable. The kidneys are unremarkable. Stable tiny adrenal nodule in the left left. The right adrenal gland is unremarkable. Pancreas unremarkable.  PELVIS: The bladder is unremarkable. No free fluid in the pelvis. Colon unremarkable. Appendix normal lumbar spine degenerative change.      1. No acute intra-abdominal or pelvic abnormality 2. Fatty infiltration of the liver  Radiation dose reduction techniques were utilized, including automated exposure control and exposure modulation based on body size.   This report was finalized on 11/8/2023 6:21 PM by Dr. Fady Gatica M.D on Workstation: [id number]       Ordered the above noted radiological studies. Reviewed by me in PACS.        MEDICATIONS GIVEN IN ER  Medications   sodium chloride 0.9 % flush 10 mL (has no administration in time range)   Lidocaine 4 % 1 patch (1 patch Transdermal Medication Applied 11/8/23 1719)   morphine injection 4 mg (4 mg Intravenous Given 11/8/23 1538)   ondansetron (ZOFRAN) injection 4 mg (4 mg Intravenous Given 11/8/23 1536)   methocarbamol (ROBAXIN) tablet 750 mg (750 mg Oral Given 11/8/23 1718)   iopamidol (ISOVUE-300) 61 % injection 100 mL (85 mL Intravenous Given by Other 11/8/23 1759)           MEDICAL DECISION MAKING, PROGRESS, and CONSULTS    All labs have been independently reviewed by me.  All radiology studies have been reviewed by me and I have also reviewed the radiology report.   EKG's independently viewed and interpreted by me.  Discussion below represents my analysis of pertinent findings related to patient's condition, differential diagnosis, treatment plan and final disposition.    47-year-old male who presents from Workmen's Comp. clinic for evaluation of abdominal pain after lifting a heavy piece of furniture.  They referred him to the ER for CT imaging, however patient's symptoms are consistent with a pulled muscle.  No palpable hernia.  Extensive conversation and shared decision making with the patient  regarding imaging and he would like to proceed.  We will treat patient symptomatically and have him follow-up for further evaluation.          Shared decision making/consideration for admission:  stable for outpatient follow up      Orders placed during this visit:  Orders Placed This Encounter   Procedures    CT Abdomen Pelvis With Contrast    [place] Draw    Comprehensive Metabolic Panel    Lipase    Urinalysis With Microscopic If Indicated (No Culture) - Urine, Clean Catch    CBC Auto Differential    Insert Peripheral IV    CBC & Differential    Green Top (Gel)    Lavender Top    Gold Top - SST    Light Blue Top         Differential diagnosis include, but not limited to: Hernia, muscle strain, sciatica      Additional orders considered but not ordered: norco    Independent interpretation of labs, radiology studies, and discussions with consultants:    Discussed with Dr. Blanca, who agrees with plan.     ED Course as of 11/08/23 1839   Wed Nov 08, 2023   1604 Lipase(!): 100 [JG]   1604 Glucose(!): 350 [JG]   1604 Sodium(!): 135 [JG]   1604 Potassium: 3.6 [JG]   1604 Chloride: 98 [JG]   1604 ALT (SGPT): 38 [JG]   1604 AST (SGOT): 19 [JG]   1605 WBC: 8.20 [JG]   1605 RBC(!): 4.12 [JG]   1605 Hemoglobin(!): 11.9 [JG]   1605 Hematocrit(!): 36.5 [JG]   1605 Glucose(!): >=1000 mg/dL (3+) [JG]   1605 Blood, UA: Negative [JG]   1605 Protein, UA: Negative [JG]   1605 Leukocytes, UA: Negative [JG]   1605 Nitrite, UA: Negative [JG]   1828 Updated patient on ED workup, including labs and imaging (if performed). We discussed follow up instructions and return precautions. The patient verbalizes understanding and is ready for discharge.     Patient has appt tomorrow morning at 0730 to follow up with workman's comp clinic. He voices concerns about returning to work. Work note given for tomorrow until he can be re-evaluated by the clinic and I also encouraged him to follow up with his PCP should he feel his work note needs to be  extended.  [JG]      ED Course User Index  [JG] Jennifer Munoz APRN             DIAGNOSIS  Final diagnoses:   Strain of abdominal muscle, initial encounter   Work related injury         DISPOSITION  Discharge         Latest Documented Vital Signs:  As of 18:39 EST  BP- 138/80 HR- 105 Temp- 97.7 °F (36.5 °C) (Tympanic) O2 sat- 99%              --    Please note that portions of this were completed with a voice recognition program.       Note Disclaimer: At [facility], we believe that sharing information builds trust and better relationships. You are receiving this note because you are receiving care at [facility] or recently visited. It is possible you will see health information before a provider has talked with you about it. This kind of information can be easy to misunderstand. To help you fully understand what it means for your health, we urge you to discuss this note with your provider.             Jennifer Munoz APRN  11/08/23 0010

## 2023-11-08 NOTE — Clinical Note
[facility] EMERGENCY DEPARTMENT  [address] [facility] [address]  Phone: [phone]    Darvin Leonard was seen and treated in our emergency department on 11/8/2023.  He may return to work on 11/10/2023.         Thank you for choosing [facility].    Jennifer Munoz APRN

## 2023-11-08 NOTE — ED PROVIDER NOTES
MD ATTESTATION NOTE  The TITI and I have discussed this patient's history, physical exam, and treatment plan. I have reviewed the TITI documentation and I affirm the documentation and agree with their diagnostics, findings, treatment, plan, and disposition.    I provided a substantive portion of the care of this patient and personally had a face to face interaction with the patient. I personally performed the physical exam, in its entirety.  The attached note describes my personal findings.    PCP: Tana Hernandez APRN  Patient Care Team:  Tana Hernandez APRN as PCP - General (Family Medicine)     Darvin Leonard is a 47 y.o. male who presents to the ED c/o right flank pain and right abdominal pain.  Patient reports he was at baseline health, he works at [employer], regularly picks up things, was picking up something heavy and began having right flank as well as right lateral belly pain.  Patient complains of dull acute pain, worsened with movement, area is tender to touch.  Patient denies any abdominal pain before this, denies any recent nausea vomiting diarrhea or urinary symptoms.  Patient reports he was eating and drinking normally but has not eaten or drank anything recently.  Patient reports that he was seen at outside facility and sent for CT imaging for further evaluation.    On exam:  General: NAD.  Head: NCAT.  ENT: nares patent, no scleral icterus  Neck: Supple, trachea midline.  Cardiac: regular rate and rhythm.  Lungs: normal effort.  Abdomen: Soft, nondistended, right lateral abdominal tenderness, right flank tenderness, negative Roberson's, negative McBurney's, no rebound tenderness, no guarding or rigidity.  Lumbar spine: No step-offs or deformities, no midline tenderness to palpation.  No CVA tenderness bilaterally..   Extremities: Moves all extremities well, no peripheral edema  Neuro: alert, MAEW, follows commands  Psych: calm, cooperative  Skin: Warm, dry.    Medical Decision  Making:  After the initial H&P, I discussed pertinent information from history and physical exam with patient/family.  Discussed differential diagnosis.  Discussed plan for ED evaluation/work-up/treatment.  All questions answered.  Patient/family is agreeable with plan.    ED Course as of 11/08/23 1840   Wed Nov 08, 2023   1604 Lipase(!): 100 [JG]   1604 Glucose(!): 350 [JG]   1604 Sodium(!): 135 [JG]   1604 Potassium: 3.6 [JG]   1604 Chloride: 98 [JG]   1604 ALT (SGPT): 38 [JG]   1604 AST (SGOT): 19 [JG]   1605 WBC: 8.20 [JG]   1605 RBC(!): 4.12 [JG]   1605 Hemoglobin(!): 11.9 [JG]   1605 Hematocrit(!): 36.5 [JG]   1605 Glucose(!): >=1000 mg/dL (3+) [JG]   1605 Blood, UA: Negative [JG]   1605 Protein, UA: Negative [JG]   1605 Leukocytes, UA: Negative [JG]   1605 Nitrite, UA: Negative [JG]   1828 Updated patient on ED workup, including labs and imaging (if performed). We discussed follow up instructions and return precautions. The patient verbalizes understanding and is ready for discharge.     Patient has appt tomorrow morning at 0730 to follow up with workman's comp clinic. He voices concerns about returning to work. Work note given for tomorrow until he can be re-evaluated by the clinic and I also encouraged him to follow up with his PCP should he feel his work note needs to be extended.  [JG]      ED Course User Index  [JG] Jennifer Munoz APRN       Diagnosis  Final diagnoses:   Strain of abdominal muscle, initial encounter   Work related injury          Karl Blanca MD  11/08/23 1840

## 2023-11-21 DIAGNOSIS — E11.9 TYPE 2 DIABETES MELLITUS WITHOUT COMPLICATION, WITHOUT LONG-TERM CURRENT USE OF INSULIN: Primary | Chronic | ICD-10-CM

## 2023-12-20 DIAGNOSIS — E11.9 TYPE 2 DIABETES MELLITUS WITHOUT COMPLICATION, WITHOUT LONG-TERM CURRENT USE OF INSULIN: Chronic | ICD-10-CM

## 2024-05-30 ENCOUNTER — TELEPHONE (OUTPATIENT)
Dept: INTERNAL MEDICINE | Facility: CLINIC | Age: 49
End: 2024-05-30
Payer: COMMERCIAL

## 2024-05-30 DIAGNOSIS — E11.9 TYPE 2 DIABETES MELLITUS WITHOUT COMPLICATION, WITHOUT LONG-TERM CURRENT USE OF INSULIN: Chronic | ICD-10-CM

## 2024-05-30 DIAGNOSIS — I10 ESSENTIAL HYPERTENSION: Chronic | ICD-10-CM

## 2024-05-30 RX ORDER — GLIPIZIDE 10 MG/1
10 TABLET ORAL
Qty: 180 TABLET | Refills: 1 | OUTPATIENT
Start: 2024-05-30

## 2024-05-30 NOTE — TELEPHONE ENCOUNTER
Caller: Darvin Leonard    Relationship: Self    Best call back number: [phone]     Requested Prescriptions: metFORMIN (Glucophage) 1000 MG tablet     lisinopril (PRINIVIL,ZESTRIL) 20 MG tablet    glipizide (GLUCOTROL) 10 MG tablet     Pharmacy where request should be sent:         [facility] [id number] - [phone]  - [phone]  [phone]       Last office visit with prescribing clinician: 9/29/2023   Last telemedicine visit with prescribing clinician: Visit date not found   Next office visit with prescribing clinician: Visit date not found     Additional details provided by patient: PATIENT IS CALLING TO STATE HIS INSURANCE EXPIRES TOMORROW.  HE IS WANTING TO KNOW IF HE CAN GET A NEW PRESCRIPTION FOR THE ABOVE MEDICATIONS BEFORE THE INSURANCE IS EXPIRES.    Does the patient have less than a 3 day supply:  [x] Yes  [] No    Would you like a call back once the refill request has been completed: [x] Yes [] No    If the office needs to give you a call back, can they leave a voicemail: [x] Yes [] No    Bhavna Michael, Indra Rep   05/30/24 15:20 EDT     PLEASE ADVISE.

## 2024-05-31 RX ORDER — GLIPIZIDE 10 MG/1
10 TABLET ORAL
Qty: 60 TABLET | Refills: 0 | Status: SHIPPED | OUTPATIENT
Start: 2024-05-31

## 2024-05-31 RX ORDER — LISINOPRIL 20 MG/1
20 TABLET ORAL
Qty: 30 TABLET | Refills: 0 | Status: SHIPPED | OUTPATIENT
Start: 2024-05-31

## 2024-05-31 NOTE — TELEPHONE ENCOUNTER
Patient states he can not come in today he is at work and can not take off. He just ask that you refill these and he will schedule something for a later date but he needs these today so insurance will cover it and he is almost out

## 2024-06-26 DIAGNOSIS — E11.9 TYPE 2 DIABETES MELLITUS WITHOUT COMPLICATION, WITHOUT LONG-TERM CURRENT USE OF INSULIN: Chronic | ICD-10-CM

## 2024-06-26 RX ORDER — GLIPIZIDE 10 MG/1
10 TABLET ORAL
Qty: 60 TABLET | Refills: 0 | OUTPATIENT
Start: 2024-06-26

## 2024-08-01 DIAGNOSIS — E11.9 TYPE 2 DIABETES MELLITUS WITHOUT COMPLICATION, WITHOUT LONG-TERM CURRENT USE OF INSULIN: Chronic | ICD-10-CM

## 2024-08-01 RX ORDER — GLIPIZIDE 10 MG/1
10 TABLET ORAL
Qty: 60 TABLET | Refills: 0 | Status: SHIPPED | OUTPATIENT
Start: 2024-08-01

## 2024-08-01 NOTE — TELEPHONE ENCOUNTER
Caller: Darvin Leonard    Relationship: Self    Requested Prescriptions:   Requested Prescriptions     Pending Prescriptions Disp Refills    glipizide (GLUCOTROL) 10 MG tablet 60 tablet 0     Sig: Take 1 tablet by mouth 2 (Two) Times a Day Before Meals.    metFORMIN (Glucophage) 1000 MG tablet 60 tablet 0     Sig: Take 1 tablet by mouth 2 (Two) Times a Day With Meals.      Pharmacy where request should be sent: [facility] [id number] [address] - [phone]  - [phone] FX     Last office visit with prescribing clinician: 9/29/2023   Last telemedicine visit with prescribing clinician: Visit date not found   Next office visit with prescribing clinician: Visit date not found     Additional details provided by patient: PATIENT IS OUT.       Does the patient have less than a 3 day supply:  [x] Yes  [] No    Would you like a call back once the refill request has been completed: [] Yes [x] No    If the office needs to give you a call back, can they leave a voicemail: [] Yes [x] No    Indra Marquez Rep   08/01/24 11:56 EDT

## 2024-08-01 NOTE — TELEPHONE ENCOUNTER
Patient states he is completely out of these medications and he is without insurance right now after having to resign from his job

## 2024-08-27 DIAGNOSIS — E11.9 TYPE 2 DIABETES MELLITUS WITHOUT COMPLICATION, WITHOUT LONG-TERM CURRENT USE OF INSULIN: Chronic | ICD-10-CM

## 2024-08-27 RX ORDER — GLIPIZIDE 10 MG/1
10 TABLET ORAL
Qty: 60 TABLET | Refills: 0 | OUTPATIENT
Start: 2024-08-27

## 2024-08-27 RX ORDER — GLIPIZIDE 10 MG/1
10 TABLET ORAL
Qty: 30 TABLET | Refills: 0 | Status: SHIPPED | OUTPATIENT
Start: 2024-08-27

## 2024-08-29 ENCOUNTER — TELEPHONE (OUTPATIENT)
Dept: INTERNAL MEDICINE | Facility: CLINIC | Age: 49
End: 2024-08-29
Payer: COMMERCIAL

## 2024-08-29 NOTE — TELEPHONE ENCOUNTER
Spoke with patient earlier, he requested I call him in an hour.  Attempted to call the patient.  Call went to [employer].  Detailed message left for patient with [employer] [facility] phone numbers:  [phone] or [phone].  I also informed the patient he needs to schedule a lab appointment prior to his visit with Tiffanie.

## 2024-08-30 DIAGNOSIS — E11.9 TYPE 2 DIABETES MELLITUS WITHOUT COMPLICATION, WITHOUT LONG-TERM CURRENT USE OF INSULIN: Chronic | ICD-10-CM

## 2024-08-30 RX ORDER — GLIPIZIDE 10 MG/1
10 TABLET ORAL
Qty: 60 TABLET | OUTPATIENT
Start: 2024-08-30

## 2024-09-04 DIAGNOSIS — E11.9 TYPE 2 DIABETES MELLITUS WITHOUT COMPLICATION, WITHOUT LONG-TERM CURRENT USE OF INSULIN: Chronic | ICD-10-CM

## 2024-09-09 DIAGNOSIS — E11.9 TYPE 2 DIABETES MELLITUS WITHOUT COMPLICATION, WITHOUT LONG-TERM CURRENT USE OF INSULIN: Chronic | ICD-10-CM

## 2024-09-09 RX ORDER — GLIPIZIDE 10 MG/1
10 TABLET ORAL
Qty: 7 TABLET | Refills: 0 | Status: SHIPPED | OUTPATIENT
Start: 2024-09-09

## 2024-09-12 DIAGNOSIS — E11.9 TYPE 2 DIABETES MELLITUS WITHOUT COMPLICATION, WITHOUT LONG-TERM CURRENT USE OF INSULIN: Chronic | ICD-10-CM

## 2024-09-18 ENCOUNTER — TELEPHONE (OUTPATIENT)
Dept: INTERNAL MEDICINE | Facility: CLINIC | Age: 49
End: 2024-09-18
Payer: COMMERCIAL

## 2024-09-22 DIAGNOSIS — E11.9 TYPE 2 DIABETES MELLITUS WITHOUT COMPLICATION, WITHOUT LONG-TERM CURRENT USE OF INSULIN: Chronic | ICD-10-CM

## 2024-09-23 RX ORDER — GLIPIZIDE 10 MG/1
10 TABLET ORAL
Qty: 180 TABLET | OUTPATIENT
Start: 2024-09-23

## 2024-09-24 DIAGNOSIS — E11.9 TYPE 2 DIABETES MELLITUS WITHOUT COMPLICATION, WITHOUT LONG-TERM CURRENT USE OF INSULIN: Chronic | ICD-10-CM

## 2024-10-03 ENCOUNTER — TELEPHONE (OUTPATIENT)
Dept: INTERNAL MEDICINE | Facility: CLINIC | Age: 49
End: 2024-10-03
Payer: COMMERCIAL

## 2024-10-03 DIAGNOSIS — E11.9 TYPE 2 DIABETES MELLITUS WITHOUT COMPLICATION, WITHOUT LONG-TERM CURRENT USE OF INSULIN: Chronic | ICD-10-CM

## 2024-10-03 RX ORDER — GLIPIZIDE 10 MG/1
10 TABLET ORAL
Qty: 10 TABLET | Refills: 0 | Status: SHIPPED | OUTPATIENT
Start: 2024-10-03 | End: 2024-10-04

## 2024-10-03 NOTE — TELEPHONE ENCOUNTER
Caller: CYN OLIVIER    Relationship: PATIENT    Best call back number: [phone]    Which medication are you concerned about: metFORMIN (GLUCOPHAGE) 1000 MG tablet     glipizide (GLUCOTROL) 10 MG tablet     Who prescribed you this medication: DEBBY HERBERTH        What are your concerns: PATIENT IS CALLING TO STATE HE CAN NOT SCHEDULE AN APPOINTMENT UNTIL MONDAY, DUE TO GETTING HIS WORK SCHEDULE AT 5:00 PM ON FRIDAY.  HE IS ASKING IF MS [facility] WOULD BE WILLING TO SEND IN ENOUGH MEDICATION TO GET HIM THROUGH UNTIL THE APPOINTMENT.  HE STATES HE IS HAVING HEADACHES AND HIS GLUCOSE IS IN THE 300s.  I ATTEMPTED TO WARM TRANSFER, BUT LOST CONNECTION.  WE SCHEDULED AN APPOINTMENT, BUT HE IS NOT SURE IF HE CAN MAKE IT UNTIL HE GETS HIS SCHEDULE.  HE IS WANTING TO KNOW IF HE CAN GET A CALL BACK WITH MS KEVIN'S DECISION.      [facility] [id number] - [address] - [phone]  - [phone]  [phone]     PLEASE ADVISE.

## 2024-10-03 NOTE — TELEPHONE ENCOUNTER
Spoke to patient, he has agreed to Monday at 3pm and understands he will only be given enough medication to get him to this appointment and if he does not keep the appointment he will not be given additional refills. Patient voiced understanding and swears to be at the appointment Monday.

## 2024-10-04 DIAGNOSIS — E11.9 TYPE 2 DIABETES MELLITUS WITHOUT COMPLICATION, WITHOUT LONG-TERM CURRENT USE OF INSULIN: Chronic | ICD-10-CM

## 2024-10-04 DIAGNOSIS — I10 ESSENTIAL HYPERTENSION: Chronic | ICD-10-CM

## 2024-10-04 RX ORDER — GLIPIZIDE 10 MG/1
10 TABLET ORAL
Qty: 10 TABLET | Refills: 0 | Status: SHIPPED | OUTPATIENT
Start: 2024-10-04 | End: 2024-10-07 | Stop reason: SDUPTHER

## 2024-10-04 RX ORDER — LISINOPRIL 20 MG/1
20 TABLET ORAL
Qty: 4 TABLET | Refills: 0 | Status: SHIPPED | OUTPATIENT
Start: 2024-10-04 | End: 2024-10-07 | Stop reason: SDUPTHER

## 2024-10-06 PROBLEM — Z91.199 NONCOMPLIANCE: Status: ACTIVE | Noted: 2024-10-06

## 2024-10-06 PROBLEM — F41.9 ANXIETY AND DEPRESSION: Status: ACTIVE | Noted: 2024-10-06

## 2024-10-06 PROBLEM — F32.A ANXIETY AND DEPRESSION: Status: ACTIVE | Noted: 2024-10-06

## 2024-10-06 NOTE — PROGRESS NOTES
Subjective   Chief Complaint   Patient presents with    Annual Exam       History of Present Illness   48 y.o. male presents for follow up regarding DM2, HTN, obesity and anxiety/depression; unfortunately he has been noncompliant and has not been seen in greater than a year.   Denies chest pain or dyspnea. No hypoglycemia. SMBG 100s. Works long days to help him get through. Denies suicidal or homicidal ideation. Recently had 3 teeth pulled.     Declines flu shot. No eye exam for a year. He does not have insurance at this time.      Patient Active Problem List   Diagnosis    Essential hypertension    Obesity (BMI 30.0-34.9)    Gastroesophageal reflux disease without esophagitis    DERIC on CPAP    Vascular calcification    Type 2 diabetes mellitus without complication, without long-term current use of insulin    Anxiety and depression    Noncompliance       No Known Allergies    Current Outpatient Medications on File Prior to Visit   Medication Sig Dispense Refill    aspirin 81 MG EC tablet Take 1 tablet by mouth Daily.      atorvastatin (LIPITOR) 10 MG tablet Take 1 tablet by mouth Daily. 90 tablet 3    escitalopram (Lexapro) 20 MG tablet Take 1 tablet by mouth Daily. 90 tablet 3    glipizide (GLUCOTROL) 10 MG tablet TAKE 1 TABLET BY MOUTH TWO TIMES A DAY BEFORE MEALS 10 tablet 0    lisinopril (PRINIVIL,ZESTRIL) 20 MG tablet TAKE 1 TABLET BY MOUTH EVERY NIGHT AT BEDTIME 4 tablet 0    metFORMIN (GLUCOPHAGE) 1000 MG tablet TAKE 1 TABLET BY MOUTH TWICE A DAY WITH A MEAL 10 tablet 0    omeprazole (priLOSEC) 40 MG capsule Take 1 capsule by mouth Daily. 90 capsule 3    [DISCONTINUED] naproxen (NAPROSYN) 500 MG tablet Take 1 tablet by mouth 2 (Two) Times a Day As Needed for Mild Pain. 20 tablet 0    [DISCONTINUED] Semaglutide 3 MG tablet Take 1 tablet by mouth Daily. 30 tablet 3    hylan (Synvisc One) 48 MG/6ML solution prefilled syringe injection PROVIDER TO INJECT INTO THE RIGHT KNEE AS DIRECTED (Patient not taking:  Reported on 10/7/2024)      lidocaine (LIDODERM) 5 % Place 1 patch on the skin as directed by provider Daily. Remove & Discard patch within 12 hours or as directed by MD (Patient not taking: Reported on 10/7/2024) 6 each 0    [DISCONTINUED] busPIRone (BUSPAR) 5 MG tablet Take 1 tablet by mouth 3 (Three) Times a Day. Prn (Patient not taking: Reported on 10/7/2024) 90 tablet 3    [DISCONTINUED] Diclofenac Sodium (VOLTAREN) 1 % gel gel Apply  topically to the appropriate area as directed As Needed. (Patient not taking: Reported on 10/7/2024)      [DISCONTINUED] methocarbamol (ROBAXIN) 750 MG tablet Take 1 tablet by mouth 3 (Three) Times a Day As Needed for Muscle Spasms. (Patient not taking: Reported on 10/7/2024) 12 tablet 0     No current facility-administered medications on file prior to visit.       Past Medical History:   Diagnosis Date    Abscess of upper lobe of right lung with pneumonia 3/13/2020    Headache     Knee pain     Low back pain     Neck pain        Family History   Problem Relation Age of Onset    Diabetes Maternal Grandmother        Social History     Socioeconomic History    Marital status:    Tobacco Use    Smoking status: Never    Smokeless tobacco: Never   Vaping Use    Vaping status: Never Used   Substance and Sexual Activity    Alcohol use: Yes     Comment: socially     Drug use: No    Sexual activity: Defer       Past Surgical History:   Procedure Laterality Date    BRONCHOSCOPY N/A 1/30/2020    Procedure: BRONCHOSCOPY WITH RIGHT UPPER LOBE BAL AND BIOPSIES;  Surgeon: Neri Aguilera MD;  Location: [facility] ENDOSCOPY;  Service: Pulmonary       The following portions of the patient's history were reviewed and updated as appropriate: problem list, allergies, current medications, past medical history, past family history, past social history, and past surgical history.    Review of Systems    Immunization History   Administered Date(s) Administered    Flu Vaccine Quad PF >36MO  "09/20/2017, 10/21/2017    Flu Vaccine Split Quad 10/26/2018    Fluzone (or Fluarix & Flulaval for VFC) >6mos 10/30/2020, 12/30/2022, 09/29/2023    Hepatitis B 05/24/2019, 07/05/2019, 11/22/2019    Hepatitis B Adult/Adolescent IM 05/24/2019, 07/05/2019, 11/22/2019    Influenza, Unspecified 12/02/2019, 12/30/2022    Pneumococcal Conjugate 13-Valent (PCV13) 09/28/2018    Pneumococcal Polysaccharide (PPSV23) 09/04/2015    Td (TDVAX) 09/01/2004    Tdap 08/22/2014    flucelvax quad pfs =>4 YRS 12/02/2019       Objective   Vitals:    10/07/24 1442   Temp: 98.4 °F (36.9 °C)   Weight: 114 kg (252 lb)   Height: 182.9 cm (72.01\")     Body mass index is 34.17 kg/m².  Physical Exam  Vitals reviewed.   Constitutional:       Appearance: Normal appearance. He is well-developed. He is obese.   HENT:      Head: Normocephalic and atraumatic.      Mouth/Throat:      Mouth: Mucous membranes are moist.      Pharynx: Oropharynx is clear. No oropharyngeal exudate or posterior oropharyngeal erythema.   Cardiovascular:      Rate and Rhythm: Normal rate and regular rhythm.      Heart sounds: Normal heart sounds, S1 normal and S2 normal.   Pulmonary:      Effort: Pulmonary effort is normal.      Breath sounds: Normal breath sounds.   Musculoskeletal:      Right lower leg: No edema.      Left lower leg: No edema.   Skin:     General: Skin is warm and dry.   Neurological:      Mental Status: He is alert.   Psychiatric:         Mood and Affect: Mood normal.         Behavior: Behavior normal.         Procedures    Assessment & Plan   Diagnoses and all orders for this visit:    1. Type 2 diabetes mellitus without complication, without long-term current use of insulin (Primary)  Comments:  He has not been seen for over a year. Understands that he will see endocrinology, Dr. SANDERS , for this in the future. Schedule eye exam. Labs as below. Refill current medications until he establishes with endo. Declines flu shot.   Orders:  -     Comprehensive " Metabolic Panel  -     Hemoglobin A1c  -     Lipid Panel With / Chol / HDL Ratio  -     Microalbumin / Creatinine Urine Ratio - Urine, Clean Catch  -     Ambulatory Referral to Endocrinology  -     atorvastatin (LIPITOR) 10 MG tablet; Take 1 tablet by mouth Daily.  Dispense: 30 tablet; Refill: 3  -     glipizide (GLUCOTROL) 10 MG tablet; Take 1 tablet by mouth 2 (Two) Times a Day Before Meals.  Dispense: 60 tablet; Refill: 0  -     metFORMIN (GLUCOPHAGE) 1000 MG tablet; Take 1 tablet by mouth 2 (Two) Times a Day With Meals.  Dispense: 60 tablet; Refill: 0    2. Noncompliance  Comments:  Longstanding. He has not been seen in the ofice or for his labs in over a year.    3. Essential hypertension  Comments:  Controlled. Refill Lisinopril 10 mg daily and RTO 6M.  Orders:  -     lisinopril (PRINIVIL,ZESTRIL) 20 MG tablet; Take 1 tablet by mouth every night at bedtime.  Dispense: 30 tablet; Refill: 5    4. Gastroesophageal reflux disease without esophagitis  Comments:  Controlled. Refill Omeprazole as below.   Orders:  -     omeprazole (priLOSEC) 40 MG capsule; Take 1 capsule by mouth Daily.  Dispense: 30 capsule; Refill: 3    5. Obesity (BMI 30.0-34.9)  Comments:  Weight is up 3#. Weight loss via dietary changes advised. Recently out of a boot. Non-weight beaing exercise advised.    6. Vascular calcification  Comments:  Continue Atorvastatin and B-ASA daily.  Orders:  -     atorvastatin (LIPITOR) 10 MG tablet; Take 1 tablet by mouth Daily.  Dispense: 30 tablet; Refill: 3    7. Anxiety and depression  Comments:  Stable. Refill Escitalopram 20 mg daily. No longer taking Buspar.  Orders:  -     escitalopram (Lexapro) 20 MG tablet; Take 1 tablet by mouth Daily.  Dispense: 90 tablet; Refill: 0    8. Prostate cancer screening  -     PSA Screen    Records reviewed include previous OV with myself as well as labs.     Return for Lab on Friday.

## 2024-10-07 ENCOUNTER — OFFICE VISIT (OUTPATIENT)
Dept: INTERNAL MEDICINE | Facility: CLINIC | Age: 49
End: 2024-10-07

## 2024-10-07 VITALS
TEMPERATURE: 98.4 F | DIASTOLIC BLOOD PRESSURE: 80 MMHG | SYSTOLIC BLOOD PRESSURE: 120 MMHG | HEART RATE: 92 BPM | BODY MASS INDEX: 34.13 KG/M2 | RESPIRATION RATE: 20 BRPM | WEIGHT: 252 LBS | HEIGHT: 72 IN

## 2024-10-07 DIAGNOSIS — K21.9 GASTROESOPHAGEAL REFLUX DISEASE WITHOUT ESOPHAGITIS: Chronic | ICD-10-CM

## 2024-10-07 DIAGNOSIS — F32.A ANXIETY AND DEPRESSION: Chronic | ICD-10-CM

## 2024-10-07 DIAGNOSIS — I10 ESSENTIAL HYPERTENSION: Chronic | ICD-10-CM

## 2024-10-07 DIAGNOSIS — Z91.199 NONCOMPLIANCE: ICD-10-CM

## 2024-10-07 DIAGNOSIS — Z12.5 PROSTATE CANCER SCREENING: ICD-10-CM

## 2024-10-07 DIAGNOSIS — I99.8 VASCULAR CALCIFICATION: Chronic | ICD-10-CM

## 2024-10-07 DIAGNOSIS — E11.9 TYPE 2 DIABETES MELLITUS WITHOUT COMPLICATION, WITHOUT LONG-TERM CURRENT USE OF INSULIN: Primary | Chronic | ICD-10-CM

## 2024-10-07 DIAGNOSIS — F41.9 ANXIETY AND DEPRESSION: Chronic | ICD-10-CM

## 2024-10-07 DIAGNOSIS — E66.811 OBESITY (BMI 30.0-34.9): Chronic | ICD-10-CM

## 2024-10-07 PROCEDURE — 99214 OFFICE O/P EST MOD 30 MIN: CPT | Performed by: NURSE PRACTITIONER

## 2024-10-07 RX ORDER — OMEPRAZOLE 40 MG/1
40 CAPSULE, DELAYED RELEASE ORAL DAILY
Qty: 30 CAPSULE | Refills: 3 | Status: SHIPPED | OUTPATIENT
Start: 2024-10-07

## 2024-10-07 RX ORDER — GLIPIZIDE 10 MG/1
10 TABLET ORAL
Qty: 60 TABLET | Refills: 0 | Status: SHIPPED | OUTPATIENT
Start: 2024-10-07

## 2024-10-07 RX ORDER — LISINOPRIL 20 MG/1
20 TABLET ORAL
Qty: 30 TABLET | Refills: 5 | Status: SHIPPED | OUTPATIENT
Start: 2024-10-07

## 2024-10-07 RX ORDER — ESCITALOPRAM OXALATE 20 MG/1
20 TABLET ORAL DAILY
Qty: 90 TABLET | Refills: 0 | Status: SHIPPED | OUTPATIENT
Start: 2024-10-07

## 2024-10-07 RX ORDER — ATORVASTATIN CALCIUM 10 MG/1
10 TABLET, FILM COATED ORAL DAILY
Qty: 30 TABLET | Refills: 3 | Status: SHIPPED | OUTPATIENT
Start: 2024-10-07

## 2024-10-11 DIAGNOSIS — E11.9 TYPE 2 DIABETES MELLITUS WITHOUT COMPLICATION, WITHOUT LONG-TERM CURRENT USE OF INSULIN: Chronic | ICD-10-CM

## 2024-10-11 RX ORDER — GLIPIZIDE 10 MG/1
10 TABLET ORAL
Qty: 10 TABLET | OUTPATIENT
Start: 2024-10-11

## 2024-10-16 ENCOUNTER — TELEPHONE (OUTPATIENT)
Dept: INTERNAL MEDICINE | Facility: CLINIC | Age: 49
End: 2024-10-16
Payer: COMMERCIAL

## 2024-10-16 NOTE — TELEPHONE ENCOUNTER
Unfortunately he has not had his labs drawn nor has he scheduled new patient appointment with endocrinology. I only gave him 30 days of his diabetes medications as he needs to schedule with endo for diabetes management. Please reach out to patient.

## 2024-11-04 DIAGNOSIS — E11.9 TYPE 2 DIABETES MELLITUS WITHOUT COMPLICATION, WITHOUT LONG-TERM CURRENT USE OF INSULIN: Chronic | ICD-10-CM

## 2024-11-04 RX ORDER — GLIPIZIDE 10 MG/1
10 TABLET ORAL
Qty: 60 TABLET | Refills: 0 | Status: SHIPPED | OUTPATIENT
Start: 2024-11-04 | End: 2024-11-11 | Stop reason: SDUPTHER

## 2024-11-04 NOTE — TELEPHONE ENCOUNTER
I know he has been discharged but the last day of care is 11/17 and he has not yet seen endo or scheduled the appointment as you advised

## 2024-11-09 DIAGNOSIS — E11.9 TYPE 2 DIABETES MELLITUS WITHOUT COMPLICATION, WITHOUT LONG-TERM CURRENT USE OF INSULIN: Chronic | ICD-10-CM

## 2024-11-11 DIAGNOSIS — E11.9 TYPE 2 DIABETES MELLITUS WITHOUT COMPLICATION, WITHOUT LONG-TERM CURRENT USE OF INSULIN: Chronic | ICD-10-CM

## 2024-11-11 RX ORDER — GLIPIZIDE 10 MG/1
10 TABLET ORAL
Qty: 12 TABLET | Refills: 0 | Status: SHIPPED | OUTPATIENT
Start: 2024-11-11

## 2024-11-16 DIAGNOSIS — E11.9 TYPE 2 DIABETES MELLITUS WITHOUT COMPLICATION, WITHOUT LONG-TERM CURRENT USE OF INSULIN: Chronic | ICD-10-CM

## 2024-11-17 DIAGNOSIS — E11.9 TYPE 2 DIABETES MELLITUS WITHOUT COMPLICATION, WITHOUT LONG-TERM CURRENT USE OF INSULIN: Chronic | ICD-10-CM

## 2024-11-18 DIAGNOSIS — F41.9 ANXIETY AND DEPRESSION: Chronic | ICD-10-CM

## 2024-11-18 DIAGNOSIS — F32.A ANXIETY AND DEPRESSION: Chronic | ICD-10-CM

## 2024-11-18 RX ORDER — GLIPIZIDE 10 MG/1
TABLET ORAL
Qty: 12 TABLET | Refills: 0 | OUTPATIENT
Start: 2024-11-18

## 2024-11-18 RX ORDER — ESCITALOPRAM OXALATE 20 MG/1
20 TABLET ORAL DAILY
Qty: 90 TABLET | Refills: 0 | OUTPATIENT
Start: 2024-11-18

## 2024-12-10 DIAGNOSIS — E11.9 TYPE 2 DIABETES MELLITUS WITHOUT COMPLICATION, WITHOUT LONG-TERM CURRENT USE OF INSULIN: Chronic | ICD-10-CM

## 2024-12-10 RX ORDER — GLIPIZIDE 10 MG/1
10 TABLET ORAL
Qty: 60 TABLET | OUTPATIENT
Start: 2024-12-10

## 2025-06-02 DIAGNOSIS — I10 ESSENTIAL HYPERTENSION: Chronic | ICD-10-CM

## 2025-06-02 RX ORDER — LISINOPRIL 20 MG/1
20 TABLET ORAL
Qty: 30 TABLET | Refills: 5 | OUTPATIENT
Start: 2025-06-02

## 2025-06-05 DIAGNOSIS — I10 ESSENTIAL HYPERTENSION: Chronic | ICD-10-CM

## 2025-06-05 RX ORDER — LISINOPRIL 20 MG/1
20 TABLET ORAL
Qty: 30 TABLET | Refills: 5 | OUTPATIENT
Start: 2025-06-05

## (undated) DEVICE — ADAPT SWVL FIBROPTIC BRONCH

## (undated) DEVICE — TUBING, SUCTION, 1/4" X 10', STRAIGHT: Brand: MEDLINE

## (undated) DEVICE — FRCP BX RADJAW4 PULM WO NDL STD1.8X2 100

## (undated) DEVICE — SINGLE USE BIOPSY VALVE MAJ-210: Brand: SINGLE USE BIOPSY VALVE (STERILE)

## (undated) DEVICE — ADAPT CLN BIOGUARD AIR/H2O DISP

## (undated) DEVICE — VITAL SIGNS™ JACKSON-REES CIRCUITS: Brand: VITAL SIGNS™

## (undated) DEVICE — TRAP,MUCUS SPECIMEN, 80CC: Brand: MEDLINE

## (undated) DEVICE — SENSR O2 OXIMAX FNGR A/ 18IN NONSTR

## (undated) DEVICE — MSK AIRWY LARYNG LMA PILOT SZ4

## (undated) DEVICE — SINGLE USE SUCTION VALVE MAJ-209: Brand: SINGLE USE SUCTION VALVE (STERILE)

## (undated) DEVICE — LN SMPL O2 NASL/ORL SMART/CAPNOLINE PLS A/